# Patient Record
Sex: FEMALE | Race: BLACK OR AFRICAN AMERICAN | Employment: OTHER | URBAN - METROPOLITAN AREA
[De-identification: names, ages, dates, MRNs, and addresses within clinical notes are randomized per-mention and may not be internally consistent; named-entity substitution may affect disease eponyms.]

---

## 2017-01-20 ENCOUNTER — TELEPHONE (OUTPATIENT)
Dept: PULMONOLOGY | Age: 77
End: 2017-01-20

## 2017-01-20 NOTE — TELEPHONE ENCOUNTER
The pt. Is difficult to understand. She says she didn't have a sleep study. She says she has a CPap. She is reminded of her appt. With Dr. Misael Rojas on Tues, Jan 24.

## 2017-01-24 ENCOUNTER — OFFICE VISIT (OUTPATIENT)
Dept: PULMONOLOGY | Age: 77
End: 2017-01-24

## 2017-01-24 VITALS
RESPIRATION RATE: 20 BRPM | HEIGHT: 55 IN | BODY MASS INDEX: 47.21 KG/M2 | TEMPERATURE: 97.6 F | OXYGEN SATURATION: 92 % | HEART RATE: 83 BPM | DIASTOLIC BLOOD PRESSURE: 80 MMHG | SYSTOLIC BLOOD PRESSURE: 120 MMHG | WEIGHT: 204 LBS

## 2017-01-24 VITALS
RESPIRATION RATE: 20 BRPM | BODY MASS INDEX: 47.38 KG/M2 | DIASTOLIC BLOOD PRESSURE: 80 MMHG | WEIGHT: 204.75 LBS | HEART RATE: 83 BPM | HEIGHT: 55 IN | SYSTOLIC BLOOD PRESSURE: 120 MMHG | OXYGEN SATURATION: 92 % | TEMPERATURE: 97.6 F

## 2017-01-24 DIAGNOSIS — J44.9 CHRONIC OBSTRUCTIVE PULMONARY DISEASE, UNSPECIFIED COPD TYPE (HCC): ICD-10-CM

## 2017-01-24 DIAGNOSIS — G47.33 OSA (OBSTRUCTIVE SLEEP APNEA): Primary | ICD-10-CM

## 2017-01-24 RX ORDER — AZITHROMYCIN 250 MG/1
TABLET, FILM COATED ORAL
Qty: 6 TAB | Refills: 0 | Status: SHIPPED | OUTPATIENT
Start: 2017-01-24 | End: 2017-01-29

## 2017-01-24 RX ORDER — LORATADINE 10 MG/1
10 TABLET ORAL
COMMUNITY
End: 2017-08-08

## 2017-01-24 NOTE — PROGRESS NOTES
[facility]  Pulmonary, Critical Care, and Sleep Medicine      Chief complaint:  ALEXA and COPD    HPI:  Brianne Mcdonnell is 64years old and relates that she has shortness of breath and sometimes some vague chest discomfort and a cough productive of clear and yellow mucus and continued leg swelling. She relates she uses her CPAP but not every night  Allergies   Allergen Reactions    Lisinopril Angioedema     Document from PCP record     Current Outpatient Prescriptions   Medication Sig    loratadine (CLARITIN) 10 mg tablet Take 10 mg by mouth daily as needed for Other. One daily HS prn    cpap machine kit by Does Not Apply route. Change to Auto CPAP 5-20 and please provide new mask. Lots of leak on current Mask.  guaiFENesin-codeine (ROBITUSSIN AC) 100-10 mg/5 mL solution Take 5 mL by mouth four (4) times daily as needed for Cough. Max Daily Amount: 20 mL. Indications: COUGH    Lactobacillus Acidoph & Bulgar (FLORANEX) 1 million cell tab tablet Take 1 Tab by mouth two (2) times a day.  nystatin (MYCOSTATIN) topical cream Apply 1 g to affected area two (2) times a day.  predniSONE (DELTASONE) 10 mg tablet Take 5 tabs orally for two days, thereafter take 4 tabs orally for two days, thereafter take 3 tablets orally for two days, thereafter take 2 tabs for 2 days, then 1 tab for 2 days    cefpodoxime (VANTIN) 100 mg tablet Take 1 Tab by mouth every twelve (12) hours. Indications: BACTERIAL PNEUMONIA    acetaminophen (TYLENOL) 500 mg tablet Take 500 mg by mouth every six (6) hours as needed for Pain.  aspirin delayed-release 81 mg tablet Take 81 mg by mouth daily.  pantoprazole (PROTONIX) 40 mg tablet Take 40 mg by mouth daily. Indications: GASTROESOPHAGEAL REFLUX    ergocalciferol (ERGOCALCIFEROL) 50,000 unit capsule Take 50,000 Units by mouth every seven (7) days.  Indications: VITAMIN D DEFICIENCY (HIGH DOSE THERAPY)    ipratropium (ATROVENT) 0.02 % nebulizer solution 0.5 mg by Nebulization route as needed for Wheezing.  albuterol (PROVENTIL HFA, VENTOLIN HFA, PROAIR HFA) 90 mcg/actuation inhaler Take  by inhalation every six (6) hours as needed for Wheezing.  Calcium Carbonate-Vit D3-Min 600 mg calcium- 400 unit tab Take 1 Tab by mouth two (2) times a day.  gabapentin (NEURONTIN) 300 mg capsule Take 300 mg by mouth three (3) times daily.  NIFEdipine ER (ADALAT CC) 30 mg ER tablet Take  by mouth daily.  cloNIDine (CATAPRES) 0.1 mg tablet Take 0.1 mg by mouth two (2) times a day.  POTASSIUM CHLORIDE SR 10 MEQ TAB      benzonatate (TESSALON) 100 mg capsule Take 100 mg by mouth three (3) times daily as needed.  furosemide (LASIX) 20 mg tablet Take  by mouth daily.  simvastatin (ZOCOR) 40 mg tablet Take 20 mg by mouth nightly.  metFORMIN (GLUCOPHAGE) 500 mg tablet Take 1,000 mg by mouth two (2) times daily (with meals).  fluticasone-salmeterol (ADVAIR DISKUS) 250-50 mcg/dose diskus inhaler Take 1 Puff by inhalation every twelve (12) hours.  albuterol (PROVENTIL VENTOLIN) 2.5 mg /3 mL (0.083 %) nebulizer solution by Nebulization route once. No current facility-administered medications for this visit. Past Medical History   Diagnosis Date    Asthma     Chronic lung disease     COPD     Diabetes ([facility] [address]     Diabetes mellitus ([facility] [address] [facility]     History of stomach ulcers     HTN (hypertension)     Hypertension     Hypothyroid     ALEXA (obstructive sleep apnea)     Other and unspecified hyperlipidemia     Personal history of tobacco use, presenting hazards to health     Sleep disturbance, unspecified      No past surgical history on file. Social History     Social History    Marital status:      Spouse name: N/A    Number of children: N/A    Years of education: N/A     Occupational History    Not on file.      Social History Main Topics    Smoking status: Former Smoker     Packs/day: 1.50     Years: 60.00     Types: Cigarettes Start date: 9/24/1952     Quit date: 7/1/2016    Smokeless tobacco: Never Used    Alcohol use No    Drug use: No    Sexual activity: Not on file     Other Topics Concern    Not on file     Social History Narrative     Family History   Problem Relation Age of Onset    Diabetes Other     Breast Cancer Sister     Cancer Brother        Review of systems:  She denies fever chills or appetite or weight loss    Physical Exam:  Visit Vitals    /80    Pulse 83    Temp 97.6 °F (36.4 °C)    Resp 20    Ht 4' 7\" (1.397 m)    Wt 92.5 kg (204 lb)    SpO2 92%    BMI 47.41 kg/m2       Well-developed and elderly  HEENT: WL  Lymph node exam: Supraclavicular cervical lymph nodes negative  Chest: Equal symmetrical expansion no dullness no wheezes or rales rubs  Heart: Regular rhythm no gallop or murmur no JVD brawny edema of the lower extremities  Extremities: No cyanosis or clubbing    Neurological: Alert and oriented  LABS:    O2 sat 92% room air    Impression:   ALEXA by history poor compliance with CPAP  Leg edema and shortness of breath possibly related to COPD but we've never been able to quantify her COPD occurs of inability to perform pulmonary function tests but I suspect she has significant pulmonary hypertension from poorly treated ALEXA  By history bronchitis  Plan:  Short course of Zithromax  Continue bronchodilators  Continue CPAP with try to improve compliance  Followup in 4 months    Conner Ghotra MD , [facility]    CC: Anders Montes MD     [address] [facility]  [place], [address] [address],[place] [address]     P: [phone]     F: [phone]

## 2017-01-24 NOTE — PROGRESS NOTES
Lori Reyes M.D. Pulmonary Critical Care & Sleep Medicine     Sleep Office Initial Consultation    Name: Yamini Stover     : 1940     Date: 2017        Subjective:  Consult requested from Dr. Dick John for evaluation and follow up of sleep apnea. Patient actually had a sleep study that was ordered on , which has not been done, and she is in the clinic today for follow up. Briefly, patient carries a diagnosis of sleep apnea. As per the patient, she has been diagnosed many, many years ago. She has had some kind of study done somewhere [place], she does not remember the exact name of the place and does not remember the address or phone number. We do not have old sleep study results available to us. Basically she has been prescribed CPAP of 12. Along with that she started noticing worsening of symptoms. She does not snore stop breathing while using the machine, but started feeling profoundly fatigued and tired. Usually goes to bed around 8:00, wakes up at 4:00 in the morning. Usually takes about 2 minutes to go to sleep. Does wake up multiple times at nighttime to go to the bathroom because of urinary urgency. Does not have any vivid dreams. Does wake up naturally. From sleep hygiene point of view, does not have any regular sleep/wake cycle. Does watch TV, works on a computer at nighttime. Does find her mattress comfortable. Does drink coffee, tea and  the daytime. Denies any history suggestive of heart attack, stroke, COPD, heart failure, tonsillectomy. From RLS, parasomnia point of view, does have history of bedwetting because of weak bladder. Denies any symptoms suggestive of restless leg syndrome, cataplexy or narcolepsy per se. Does feel excessively sleepy and tired during the daytime, does take two one hour naps during the daytime. She currently does not drive. Her [place] sleepiness score is 18/24.     Current home care company is [facility]. We downloaded the compliance data from last 90 days, which showed patient is only 50% compliant with treatment, however patient has significant leak also noted on that and also AHI has remained around 7. Patient also has significant nasal dryness or nasal discharge and pressure intolerance with current CPAP therapy. Assessment:  1. Evaluation for sleep apnea. 2. Excess daytime sleepiness. 3. Unknown severity of sleep apnea at this point. 4. On CPAP of 12. Issues with compliance, issues with leak and issues with AHI. Discussion:  First and foremost, it is very difficult at this point to ascertain as we do not know the exact etiology of the sleep apnea to go by. What we assume is that patient just has received her CPAP machine, so more than likely patient has an established diagnosis of sleep apnea and she was getting benefit out of CPAP treatment before. Over a period of time it is possible that patient's CPAP of 12 has not been adequate as she has gained weight and patient might require higher CPAP pressure. As we do not have an exact sleep study report available to us, we will consider switching the patient to auto CPAP of 5-20 and evaluate her further with compliance download. Patient also has significant leak. She has not received any supplies for the last six months. I discussed with the patient at length. We will also send a new supply order out to the [address] [place] [address] Road to get the new supplies out. If patient continues to have excess daytime sleepiness with improvement in the AHI, then other workup should be considered. If there are still issues with AHI or compliance with auto CPAP, also a dedicated split night study to identify the exact severity of sleep apnea, as if patient just has mild sleep apnea, other treatment options can be considered.      Patient should follow up with the sleep specialist in about 2-3 months time frame with adjustment of the auto CPAP pressures and change of mask. Plan of care discussed with the patient at length. Spent more than 45 minutes of my time trying to coordinate care,  patient, go over various aspects of patient's care. More than 50% of time spent in face to face evaluation. Patient continues to have close follow up with Dr. Arnold Church in regard to hypoxemic respiratory failure and other symptoms. Final Recommendations:  1. Change to auto CPAP 5-20.  2. New mask. 3. Continue close follow up with the sleep specialist.    C:     Dr. Carlie Mcdonnell M.D. Prior/old records reviewed and discussed with patient. Labs, Images and available PFT and sleep study discussed with patient. Labs and images personally seen and available reports reviewed with patient. All current medicines are reviewed and doses and prescription adjusted. Plan of care discussed/reported to primary physician. Plan of care discussed with patient  HIM care and advance directive per PCP  Pathophysiology, severity, risk factors, association to cardiovascular morbidities and excessive daytime sleepiness, consequences of untreated sleep apnea were discussed with the patient  Treatment options including CPAP, dental appliance, weight reduction measures, positional therapy, surgeries etc were discussed  Safe driving and operating machineries practices were advised    Past Medical History   Diagnosis Date    Asthma     Chronic lung disease     COPD     [facility] [address] [facility]     Diabetes mellitus ([facility] [facility]     History of stomach ulcers     HTN (hypertension)     Hypertension     Hypothyroid     ALEXA (obstructive sleep apnea)     Other and unspecified hyperlipidemia     Personal history of tobacco use, presenting hazards to health     Sleep disturbance, unspecified        History reviewed. No pertinent past surgical history.     Social History     Social History    Marital status:      Spouse name: N/A    Number of children: N/A    Years of education: N/A     Social History Main Topics    Smoking status: Former Smoker     Packs/day: 1.50     Years: 60.00     Types: Cigarettes     Start date: 9/24/1952     Quit date: 7/1/2016    Smokeless tobacco: Never Used    Alcohol use No    Drug use: No    Sexual activity: Not Asked     Other Topics Concern    None     Social History Narrative       Family History   Problem Relation Age of Onset    Diabetes Other     Breast Cancer Sister     Cancer Brother        Allergies   Allergen Reactions    Lisinopril Angioedema     Document from PCP record       . Current Outpatient Prescriptions   Medication Sig Dispense Refill    loratadine (CLARITIN) 10 mg tablet Take 10 mg by mouth daily as needed for Other. One daily HS prn      cpap machine kit by Does Not Apply route. Change to Auto CPAP 5-20 and please provide new mask. Lots of leak on current Mask. 1 Kit 0    guaiFENesin-codeine (ROBITUSSIN AC) 100-10 mg/5 mL solution Take 5 mL by mouth four (4) times daily as needed for Cough. Max Daily Amount: 20 mL. Indications: COUGH 1 Bottle 0    Lactobacillus Acidoph & Bulgar (FLORANEX) 1 million cell tab tablet Take 1 Tab by mouth two (2) times a day. 10 Tab 0    predniSONE (DELTASONE) 10 mg tablet Take 5 tabs orally for two days, thereafter take 4 tabs orally for two days, thereafter take 3 tablets orally for two days, thereafter take 2 tabs for 2 days, then 1 tab for 2 days 32 Tab 0    cefpodoxime (VANTIN) 100 mg tablet Take 1 Tab by mouth every twelve (12) hours. Indications: BACTERIAL PNEUMONIA 6 Tab 0    acetaminophen (TYLENOL) 500 mg tablet Take 500 mg by mouth every six (6) hours as needed for Pain.  aspirin delayed-release 81 mg tablet Take 81 mg by mouth daily.  pantoprazole (PROTONIX) 40 mg tablet Take 40 mg by mouth daily. Indications: GASTROESOPHAGEAL REFLUX      ergocalciferol (ERGOCALCIFEROL) 50,000 unit capsule Take 50,000 Units by mouth every seven (7) days.  Indications: VITAMIN D DEFICIENCY (HIGH DOSE THERAPY)      ipratropium (ATROVENT) 0.02 % nebulizer solution 0.5 mg by Nebulization route as needed for Wheezing.  albuterol (PROVENTIL HFA, VENTOLIN HFA, PROAIR HFA) 90 mcg/actuation inhaler Take  by inhalation every six (6) hours as needed for Wheezing.  Calcium Carbonate-Vit D3-Min 600 mg calcium- 400 unit tab Take 1 Tab by mouth two (2) times a day.  gabapentin (NEURONTIN) 300 mg capsule Take 300 mg by mouth three (3) times daily.  NIFEdipine ER (ADALAT CC) 30 mg ER tablet Take  by mouth daily.  cloNIDine (CATAPRES) 0.1 mg tablet Take 0.1 mg by mouth two (2) times a day.  POTASSIUM CHLORIDE SR 10 MEQ TAB        furosemide (LASIX) 20 mg tablet Take  by mouth daily.  simvastatin (ZOCOR) 40 mg tablet Take 20 mg by mouth nightly.  metFORMIN (GLUCOPHAGE) 500 mg tablet Take 1,000 mg by mouth two (2) times daily (with meals).  fluticasone-salmeterol (ADVAIR DISKUS) 250-50 mcg/dose diskus inhaler Take 1 Puff by inhalation every twelve (12) hours.  albuterol (PROVENTIL VENTOLIN) 2.5 mg /3 mL (0.083 %) nebulizer solution by Nebulization route once.  nystatin (MYCOSTATIN) topical cream Apply 1 g to affected area two (2) times a day. 30 g 0    benzonatate (TESSALON) 100 mg capsule Take 100 mg by mouth three (3) times daily as needed.              Review of Systems:  HEENT: No epistaxis, no nasal drainage, no difficulty in swallowing, no redness in eyes  Respiratory: No cough sob or wheezing  Sleep: As above  Cardiovascular: no chest pain, no palpitations, no chronic leg edema, no syncope  Gastrointestinal: no abd pain, no vomiting, no diarrhea, no bleeding symptoms  Genitourinary: No urinary symptoms or hematuria  Integument/breast: No ulcers or rashes  Musculoskeletal:Neg  Neurological: No focal weakness, no seizures, no headaches  Behvioral/Psych: No anxiety, no depression  Constitutional: No fever, no chills, no weight loss, no night sweats     Objective:     Visit Vitals    /80 (BP 1 Location: Left arm, BP Patient Position: Sitting)    Pulse 83    Temp 97.6 °F (36.4 °C)    Resp 20    Ht 4' 7\" (1.397 m)    Wt 92.9 kg (204 lb 12 oz)    SpO2 92%    BMI 47.59 kg/m2        Physical Exam: Weight 92 ESS 21 Neck Cir 16.5,   General: comfortable, no acute distress  HEENT: pupils reactive, sclera anicteric, EOM intact, Malampati score 2,   Tongue: Macroglossia y Teeth indentation y  Chin: Micrognathia y  Neck: No adenopathy or thyroid swelling, no lymphadenopathy or JVD, supple  CVS: S1S2 no murmurs  RS: Mod AE bilaterally, no tactile fremitus or egophony, no accessory muscle use  Abd: soft, non tender, no hepatosplenomegaly  Neuro: non focal, awake, alert  Extrm: no leg edema, clubbing or cyanosis  Skin: no rash    Data review:     Chemistry No results for input(s): GLU, NA, K, CL, CO2, BUN, CREA, CA, MG, PHOS, AGAP, BUCR, TBIL, GPT, AP, TP, ALB, GLOB, AGRAT in the last 72 hours. Lactic Acid No results found for: LAC  No results for input(s): LAC in the last 72 hours. Liver Enzymes Protein, total   Date Value Ref Range Status   05/21/2016 7.4 6.4 - 8.2 g/dL Final     Albumin   Date Value Ref Range Status   05/21/2016 3.6 3.4 - 5.0 g/dL Final     Globulin   Date Value Ref Range Status   05/21/2016 3.8 2.0 - 4.0 g/dL Final     A-G Ratio   Date Value Ref Range Status   05/21/2016 0.9 0.8 - 1.7   Final     AST   Date Value Ref Range Status   05/21/2016 22 15 - 37 U/L Final     Alk. phosphatase   Date Value Ref Range Status   05/21/2016 89 45 - 117 U/L Final     No results for input(s): TP, ALB, GLOB, AGRAT, SGOT, GPT, AP, TBIL in the last 72 hours. No lab exists for component: DBIL     CBC w/Diff No results for input(s): WBC, RBC, HGB, HCT, PLT, GRANS, LYMPH, EOS, HGBEXT, HCTEXT, PLTEXT in the last 72 hours.      Cardiac Enzymes No results found for: CPK, CKMMB, CKMB, RCK3, CKMBT, CKNDX, CKND1, MARYCARMEN, TROPT, TROIQ, ANA, TROPT, TNIPOC, BNP, BNPP     BNP No results found for: BNP, BNPP, XBNPT     Coagulation No results for input(s): PTP, INR, APTT in the last 72 hours. No lab exists for component: INREXT      Thyroid  Lab Results   Component Value Date/Time    TSH 0.87 06/11/2014 08:00 PM          ABG No results for input(s): PHI, PHI, PCO2I, PO2, PO2I, HCO3, HCO3I, FIO2, FIO2I in the last 72 hours. No lab exists for component: POC2     Urinalysis Lab Results   Component Value Date/Time    Color YELLOW 05/21/2016 09:43 AM    Appearance CLEAR 05/21/2016 09:43 AM    Specific gravity 1.007 05/21/2016 09:43 AM    pH (UA) 8.0 05/21/2016 09:43 AM    Protein NEGATIVE  05/21/2016 09:43 AM    Glucose NEGATIVE  05/21/2016 09:43 AM    Ketone NEGATIVE  05/21/2016 09:43 AM    Bilirubin NEGATIVE  05/21/2016 09:43 AM    Urobilinogen 0.2 05/21/2016 09:43 AM    Nitrites NEGATIVE  05/21/2016 09:43 AM    Leukocyte Esterase NEGATIVE  05/21/2016 09:43 AM        Micro  No results for input(s): SDES, CULT in the last 72 hours. No results for input(s): CULT in the last 72 hours. XR (Most Recent). CXR reviewed by me and compared with previous CXR   Results from Hospital Encounter encounter on 05/21/16   XR CHEST PORT   Narrative Description:  Portable upright chest, single view    Clinical Indication:  Shortness of breath    Comparison: June 11, 2014; April 6, 2009. Findings: There is opacity at the left lung base which obscures the left hemidiaphragm and  costophrenic angle, increased from the comparison exams. Mildly increased  interstitial markings at the right lung base with some central prominence of  pulmonary vasculature. The cardiac silhouette is upper limits of normal or just  above, stable. Moderate atherosclerosis of the thoracic aorta. The femoral heads are high riding bilaterally.          Impression Impression:      Most convincing interval change since comparison radiographs is obscuration of  the left hemidiaphragm compatible with underlying atelectasis or infiltrate. There may be a mild component of central vascular congestion which is increased  from the comparison. CT (Most Recent)   Results from Hospital Encounter encounter on 05/21/16   CTA CHEST W WO CONT   Narrative CTA chest- pulmonary embolus protocol     CPT code: 35889     Indications: Shortness of breath. Technique: Utilizing pulmonary embolus protocol, thin section axial images were  obtained from the thoracic inlet into the upper abdomen after the uneventful  administration of IV contrast. Coronal and sagittal maximum intensity projection  (MIP) post-processed images were generated to better define pulmonary artery  anatomy and enhance sensitivity for detection of pulmonary emboli. Contrast used: 100 cc [id number]    Comparison: October 1, 2009. Findings:    Mildly inhomogeneous opacification of distal pulmonary arteries limits  sensitivity for pulmonary embolism somewhat. No definite filling defects are  seen within the main or segmental pulmonary arteries. Distal subsegmental branch  is not well evaluated. Thoracic aorta shows moderate-marked atherosclerotic  disease without aneurysm or dissection. Visualized thyroid unremarkable. No  mediastinal, hilar or axillary adenopathy. No pericardial effusion. No pleural  effusion. There is bibasilar atelectasis, left greater than right. Mildly  prominent interstitial markings particularly on the right. There is AP narrowing  of the distal trachea and of the left mainstem bronchus. A small portion of the upper abdomen is included on this study. The visualized  portions of the upper abdomen show fusiform thickening of the left adrenal gland  with low central attenuation suggesting adenomatous changes. There is a small  hiatal hernia present. Upper abdomen otherwise unremarkable. Moderate thoracic spondylosis is seen. Impression IMPRESSION[de-identified]    1.  Mildly limited evaluation of distal subsegmental branches. No convincing  evidence of pulmonary embolus. 2. Bibasilar atelectasis, left greater than right. Superimposed infection not  excluded but considered less likely. 3. Mild interstitial edema. 4. Tracheobronchomalacia. 5. Marked atherosclerosis of the thoracic aorta. No dissection or aneurysm. 6. Hiatal hernia. 7. Diffuse thickening of the left adrenal gland with low-attenuation change  compatible with adenomatous hyperplasia. Thank you for this referral.           EKG No results found for this or any previous visit. ECHO No results found for this or any previous visit. PFT No flowsheet data found.             Terrence Navarro MD  Pulmonary Critical Care & Sleep Medicine   0

## 2017-01-24 NOTE — PROGRESS NOTES
The pt. States she was seen by a Sleep specialist here some years ago. She has had a Cpap for a number of years.

## 2017-01-24 NOTE — PATIENT INSTRUCTIONS
Learning About CPAP for Sleep Apnea  What is CPAP? CPAP is a small machine that you use at home every night while you sleep. It increases air pressure in your throat to keep your airway open. When you have sleep apnea, this can help you sleep better so you feel much better. CPAP stands for \"continuous positive airway pressure. \"  The CPAP machine will have one of the following:  · A mask that covers your nose and mouth  · Prongs that fit into your nose  · A mask that covers your nose only, the most common type. This type is called NCPAP. The N stands for \"nasal.\"  Why is it done? CPAP is usually the best treatment for obstructive sleep apnea. It is the first treatment choice and the most widely used. Your doctor may suggest CPAP if you have:  · Moderate to severe sleep apnea. · Sleep apnea and coronary artery disease (CAD) or heart failure. How does it help? · CPAP can help you have more normal sleep, so you feel less sleepy and more alert during the daytime. · CPAP may help keep heart failure or other heart problems from getting worse. · CPAP may help lower your blood pressure. · If you use CPAP, your bed partner may also sleep better because you are not snoring or restless. What are the side effects? Some people who use CPAP have:  · A dry or stuffy nose and a sore throat. · Irritated skin on the face. · Sore eyes. · Bloating. If you have any of these problems, work with your doctor to fix them. Here are some things you can try:  · Be sure the mask or nasal prongs fit well. · See if your doctor can adjust the pressure of your CPAP. · If your nose is dry, try a humidifier. · If your nose is runny or stuffy, try decongestant medicine or a steroid nasal spray. Be safe with medicines. Read and follow all instructions on the label. Do not use the medicine longer than the label says. If these things do not help, you might try a different type of machine.  Some machines have air pressure that adjusts on its own. Others have air pressures that are different when you breathe in than when you breathe out. This may reduce discomfort caused by too much pressure in your nose. Where can you learn more? Go to http://timbo-tiesha.info/. Enter K870 in the search box to learn more about \"Learning About CPAP for Sleep Apnea. \"  Current as of: May 23, 2016  Content Version: 11.1  © 20063563-8875 [employer]. Care instructions adapted under license by [employer] (which disclaims liability or warranty for this information). If you have questions about a medical condition or this instruction, always ask your healthcare professional. [address] any warranty or liability for your use of this information.

## 2017-01-26 ENCOUNTER — PATIENT OUTREACH (OUTPATIENT)
Dept: PULMONOLOGY | Age: 77
End: 2017-01-26

## 2017-01-26 NOTE — PROGRESS NOTES
65 yo female referred to  by Dr. Prasad Garcia for CPAP, medication and chronic condition education. Met with patient in exam room. Patient seated in wheelchair. A&Ox3. Social: lives alone. Daughter and nephew assist as needed. Patient reports decline in functional status and would like assistance at home. Patient with [employer] Medicare with Kailey Larsen PCP. No LTC insurance. Medicare will not pay for personal care. Patient will need to hire privately and pay out of pocket or apply for [employer] [address] Patient reports she has applied for Medicaid \"3-4 months\" ago. States she had received a follow up call to schedule home visit but the patient did not call back. Encouraged patient to continue contact with  as there are several steps to apply for Medicaid. Patient provided with a list of [address]. Patient can call and inquire about pricing and availability. No doctor order is needed. Kailey Larsen has Case Management services as well as Social Workers. Encouraged patient to seek support from Kailey Larsen for guidance with Medicaid, personal care and [place] [address] options. Patient has CPAP appliance for ALEXA. Reported inconsistent compliance due to mask issues. Dr. Ben Caballero has evaluated and new settings as well as new mask options have been ordered. Plan of care:   1. Patient to follow up with  re: Medicaid application. 2. Patient can private pay for personal care services until Medicaid obtained: list of local agencies provided to patient. 3. Patient to request case management/social work services with Kailey Larsen to assist with Medicaid process. Patient has [employer] which has case management programs but as [facility], all services have to be handled by Kailey Larsen.

## 2017-02-06 ENCOUNTER — PATIENT OUTREACH (OUTPATIENT)
Dept: PULMONOLOGY | Age: 77
End: 2017-02-06

## 2017-02-14 ENCOUNTER — PATIENT OUTREACH (OUTPATIENT)
Dept: PULMONOLOGY | Age: 77
End: 2017-02-14

## 2017-02-14 NOTE — PROGRESS NOTES
Contacted [facility] re: CPAP. Spoke with Christo Blair, confirms receipt of order for CPAP. States they have not been able to contact the patient. Reports calling 2/9 and 2/13, no voice mail. Reports also trying to contact daughter without success. Confirmed phone numbers. [facility] will continue to reach out to patient.

## 2017-02-21 ENCOUNTER — HOSPITAL ENCOUNTER (OUTPATIENT)
Dept: NON INVASIVE DIAGNOSTICS | Age: 77
Discharge: HOME | End: 2017-02-21
Attending: INTERNAL MEDICINE
Payer: MEDICARE

## 2017-02-21 DIAGNOSIS — G47.33 OSA (OBSTRUCTIVE SLEEP APNEA): ICD-10-CM

## 2017-02-21 PROCEDURE — 93306 TTE W/DOPPLER COMPLETE: CPT

## 2017-03-07 ENCOUNTER — PATIENT OUTREACH (OUTPATIENT)
Dept: PULMONOLOGY | Age: 77
End: 2017-03-07

## 2017-03-07 NOTE — PROGRESS NOTES
Per [facility], CPAP setting have been changed remotely. Mask was delivered 2/28, received nasal cushion mask. Compliance report shows patient has not used machine since mask change. [facility] will send RT to reach out to patient in hopes of troubleshooting and increasing compliance. Attempted to contact patient, no answer, no voice mail.

## 2017-05-16 ENCOUNTER — OFFICE VISIT (OUTPATIENT)
Dept: PULMONOLOGY | Age: 77
End: 2017-05-16

## 2017-05-16 VITALS
SYSTOLIC BLOOD PRESSURE: 110 MMHG | HEIGHT: 55 IN | DIASTOLIC BLOOD PRESSURE: 60 MMHG | HEART RATE: 85 BPM | TEMPERATURE: 97.5 F | OXYGEN SATURATION: 95 % | WEIGHT: 201 LBS | RESPIRATION RATE: 15 BRPM | BODY MASS INDEX: 46.52 KG/M2

## 2017-05-16 DIAGNOSIS — G47.33 OSA (OBSTRUCTIVE SLEEP APNEA): Primary | ICD-10-CM

## 2017-05-16 DIAGNOSIS — J44.9 CHRONIC OBSTRUCTIVE PULMONARY DISEASE, UNSPECIFIED COPD TYPE (HCC): ICD-10-CM

## 2017-05-16 RX ORDER — TRAMADOL HYDROCHLORIDE 50 MG/1
50 TABLET ORAL
COMMUNITY
End: 2017-08-08

## 2017-05-16 NOTE — MR AVS SNAPSHOT
Visit Information Date & Time Provider Department Dept. Phone Encounter #  
 5/16/2017 10:15 AM [facility] Lore Yang [id number] Follow-up Instructions Return in about 6 months (around 11/16/2017). Follow-up and Disposition History Your Appointments 5/16/2017 10:15 AM  
Follow Up with Freddy Arias MD  
[address] ([facility]) Appt Note: FROM 1/24/17; Spoke w/January @ [facility] to req ref 5/9/17; ***PHONE NUMBERS NOT WORKING***  
 [address] [place] [address], [address] [address]  
[phone]  
  
   
 [address] [place] [address], [address] [place] [address],[address] [place] [address] Upcoming Health Maintenance Date Due  
 FOOT EXAM Q1 5/28/1950 EYE EXAM RETINAL OR DILATED Q1 5/28/1950 DTaP/Tdap/Td series (1 - Tdap) 5/28/1959 ZOSTER VACCINE AGE 60> 5/28/1998 GLAUCOMA SCREENING Q2Y 5/28/2003 Pneumococcal 65+ Low/Medium Risk (1 of 2 - PCV13) 5/28/2003 MEDICARE YEARLY EXAM 5/28/2003 MICROALBUMIN Q1 3/25/2012 LIPID PANEL Q1 3/25/2012 HEMOGLOBIN A1C Q6M 11/21/2016 INFLUENZA AGE 9 TO ADULT 8/1/2017 Allergies as of 5/16/2017  Review Complete On: 5/16/2017 By: Nkechi Mota RN Severity Noted Reaction Type Reactions Lisinopril High 05/22/2016    Angioedema Document from PCP record Current Immunizations  Never Reviewed No immunizations on file. Not reviewed this visit You Were Diagnosed With   
  
 Codes Comments ALEXA (obstructive sleep apnea)    -  Primary ICD-10-CM: G47.33 
ICD-9-CM: 327.23 Chronic obstructive pulmonary disease, unspecified COPD type ([facility]     ICD-10-CM: J44.9 ICD-9-CM: 987 Vitals BP Pulse Temp Resp Height(growth percentile) Weight(growth percentile) 110/60 (BP 1 Location: Left arm, BP Patient Position: Sitting) 85 97.5 °F (36.4 °C) (Oral) 15 4' 7\" (1.397 m) 201 lb (91.2 kg) SpO2 BMI OB Status Smoking Status 95% 46.72 kg/m2 Postmenopausal Former Smoker Vitals History BMI and BSA Data Body Mass Index Body Surface Area 46.72 kg/m 2 1.88 m 2 Your Updated Medication List  
  
   
This list is accurate as of: 5/16/17 10:04 AM.  Always use your most recent med list.  
  
  
  
  
 acetaminophen 500 mg tablet Commonly known as:  TYLENOL Take 500 mg by mouth every six (6) hours as needed for Pain. ADVAIR DISKUS 250-50 mcg/dose diskus inhaler Generic drug:  fluticasone-salmeterol Take 1 Puff by inhalation every twelve (12) hours. * albuterol 2.5 mg /3 mL (0.083 %) nebulizer solution Commonly known as:  PROVENTIL VENTOLIN  
by Nebulization route once. * albuterol 90 mcg/actuation inhaler Commonly known as:  PROVENTIL HFA, VENTOLIN HFA, PROAIR HFA Take  by inhalation every six (6) hours as needed for Wheezing. aspirin delayed-release 81 mg tablet Take 81 mg by mouth daily. Calcium Carbonate-Vit D3-Min 600 mg calcium- 400 unit Tab Take 1 Tab by mouth two (2) times a day. cefpodoxime 100 mg tablet Commonly known as:  Oneta Gall Take 1 Tab by mouth every twelve (12) hours. Indications: BACTERIAL PNEUMONIA  
  
 cloNIDine HCl 0.1 mg tablet Commonly known as:  CATAPRES Take 0.1 mg by mouth two (2) times a day. cpap machine kit  
by Does Not Apply route. Change to Auto CPAP 5-20 and please provide new mask. Lots of leak on current Mask. ergocalciferol 50,000 unit capsule Commonly known as:  ERGOCALCIFEROL Take 50,000 Units by mouth every seven (7) days. Indications: VITAMIN D DEFICIENCY (HIGH DOSE THERAPY)  
  
 gabapentin 300 mg capsule Commonly known as:  NEURONTIN Take 300 mg by mouth three (3) times daily. guaiFENesin-codeine 100-10 mg/5 mL solution Commonly known as:  ROBITUSSIN AC Take 5 mL by mouth four (4) times daily as needed for Cough. Max Daily Amount: 20 mL.  Indications: COUGH  
  
 ipratropium 0.02 % nebulizer solution Commonly known as:  ATROVENT  
0.5 mg by Nebulization route as needed for Wheezing. LASIX 20 mg tablet Generic drug:  furosemide Take  by mouth daily. loratadine 10 mg tablet Commonly known as:  Zaria Gills Take 10 mg by mouth daily as needed for Other. One daily HS prn  
  
 metFORMIN 500 mg tablet Commonly known as:  GLUCOPHAGE Take 1,000 mg by mouth two (2) times daily (with meals). NIFEdipine ER 30 mg ER tablet Commonly known as:  ADALAT CC Take  by mouth daily. nystatin topical cream  
Commonly known as:  MYCOSTATIN Apply 1 g to affected area two (2) times a day. pantoprazole 40 mg tablet Commonly known as:  PROTONIX Take 40 mg by mouth daily. Indications: GASTROESOPHAGEAL REFLUX  
  
 POTASSIUM CHLORIDE SR 10 MEQ TAB  
  
 simvastatin 40 mg tablet Commonly known as:  ZOCOR Take 20 mg by mouth nightly. traMADol 50 mg tablet Commonly known as:  ULTRAM  
Take 50 mg by mouth every six (6) hours as needed for Pain. * Notice: This list has 2 medication(s) that are the same as other medications prescribed for you. Read the directions carefully, and ask your doctor or other care provider to review them with you. Follow-up Instructions Return in about 6 months (around 11/16/2017). Patient Instructions Always use her CPAP at night with sleep Continue Advair 2 puffs twice daily and remember to wash mouth with water and spit it out after inhaling Advair Albuterol by nebulization or by hand-held inhaler (2 puffs) every 4 hours as needed and if you require albuterol too often to control respiratory symptoms call the office for severe symptoms go to the emergency room Always call for symptoms such as increasing shortness of breath or cough productive of discolored mucus Introducing [facility]!    
 [facility] [employer] introduces [employer] patient portal. Now you can access parts of your medical record, email your doctor's office, and request medication refills online. 1. In your internet browser, go to [email or web address]. [email or web address] Card 2. Click on the First Time User? Click Here link in the Sign In box. You will see the New Member Sign Up page. 3. Enter your [employer] Access Code exactly as it appears below. You will not need to use this code after youve completed the sign-up process. If you do not sign up before the expiration date, you must request a new code. · [employer] Access Code: [email or web address] Expires: 8/14/2017  9:57 AM 
 
4. Enter the last four digits of your Social Security Number (xxxx) and Date of Birth (mm/dd/yyyy) as indicated and click Submit. You will be taken to the next sign-up page. 5. Create a [employer] ID. This will be your [employer] login ID and cannot be changed, so think of one that is secure and easy to remember. 6. Create a [employer] password. You can change your password at any time. 7. Enter your Password Reset Question and Answer. This can be used at a later time if you forget your password. 8. Enter your e-mail address. You will receive e-mail notification when new information is available in [address] 9. Click Sign Up. You can now view and download portions of your medical record. 10. Click the Download Summary menu link to download a portable copy of your medical information. If you have questions, please visit the Frequently Asked Questions section of the [employer] website. Remember, [employer] is NOT to be used for urgent needs. For medical emergencies, dial 911. Now available from your iPhone and Android! Please provide this summary of care documentation to your next provider. Your primary care clinician is listed as Arelis Steiner. If you have any questions after today's visit, please call [phone].

## 2017-05-16 NOTE — PROGRESS NOTES
[facility]  Pulmonary, Critical Care, and Sleep Medicine      Chief complaint:  COPD and ALEXA    HPI:  Vera Sanchez is 64years old returns to the office today for followup concerning COPD and ALEXA and relates that she uses her CPAP some of the time and uses her Advair faithfully and uses her albuterol about once a day. She denies chest pain and reports a cough productive of clear mucus recently related to what she called a cold and reports improving leg swelling without leg pain  And continues to have dyspnea on exertion. Allergies   Allergen Reactions    Lisinopril Angioedema     Document from PCP record     Current Outpatient Prescriptions   Medication Sig    traMADol (ULTRAM) 50 mg tablet Take 50 mg by mouth every six (6) hours as needed for Pain.  loratadine (CLARITIN) 10 mg tablet Take 10 mg by mouth daily as needed for Other. One daily HS prn    cpap machine kit by Does Not Apply route. Change to Auto CPAP 5-20 and please provide new mask. Lots of leak on current Mask.  nystatin (MYCOSTATIN) topical cream Apply 1 g to affected area two (2) times a day.  acetaminophen (TYLENOL) 500 mg tablet Take 500 mg by mouth every six (6) hours as needed for Pain.  aspirin delayed-release 81 mg tablet Take 81 mg by mouth daily.  pantoprazole (PROTONIX) 40 mg tablet Take 40 mg by mouth daily. Indications: GASTROESOPHAGEAL REFLUX    ergocalciferol (ERGOCALCIFEROL) 50,000 unit capsule Take 50,000 Units by mouth every seven (7) days. Indications: VITAMIN D DEFICIENCY (HIGH DOSE THERAPY)    ipratropium (ATROVENT) 0.02 % nebulizer solution 0.5 mg by Nebulization route as needed for Wheezing.  albuterol (PROVENTIL HFA, VENTOLIN HFA, PROAIR HFA) 90 mcg/actuation inhaler Take  by inhalation every six (6) hours as needed for Wheezing.  Calcium Carbonate-Vit D3-Min 600 mg calcium- 400 unit tab Take 1 Tab by mouth two (2) times a day.     gabapentin (NEURONTIN) 300 mg capsule Take 300 mg by mouth three (3) times daily.  NIFEdipine ER (ADALAT CC) 30 mg ER tablet Take  by mouth daily.  cloNIDine (CATAPRES) 0.1 mg tablet Take 0.1 mg by mouth two (2) times a day.  POTASSIUM CHLORIDE SR 10 MEQ TAB      furosemide (LASIX) 20 mg tablet Take  by mouth daily.  simvastatin (ZOCOR) 40 mg tablet Take 20 mg by mouth nightly.  metFORMIN (GLUCOPHAGE) 500 mg tablet Take 1,000 mg by mouth two (2) times daily (with meals).  fluticasone-salmeterol (ADVAIR DISKUS) 250-50 mcg/dose diskus inhaler Take 1 Puff by inhalation every twelve (12) hours.  albuterol (PROVENTIL VENTOLIN) 2.5 mg /3 mL (0.083 %) nebulizer solution by Nebulization route once.  guaiFENesin-codeine (ROBITUSSIN AC) 100-10 mg/5 mL solution Take 5 mL by mouth four (4) times daily as needed for Cough. Max Daily Amount: 20 mL. Indications: COUGH    cefpodoxime (VANTIN) 100 mg tablet Take 1 Tab by mouth every twelve (12) hours. Indications: BACTERIAL PNEUMONIA     No current facility-administered medications for this visit. Past Medical History:   Diagnosis Date    Asthma     Chronic lung disease     COPD     Diabetes ([facility]     Diabetes mellitus ([facility]     History of stomach ulcers     HTN (hypertension)     Hypertension     Hypothyroid     ALEXA (obstructive sleep apnea)     Other and unspecified hyperlipidemia     Personal history of tobacco use, presenting hazards to health     Sleep disturbance, unspecified      No past surgical history on file. Social History     Social History    Marital status:      Spouse name: N/A    Number of children: N/A    Years of education: N/A     Occupational History    Not on file.      Social History Main Topics    Smoking status: Former Smoker     Packs/day: 1.50     Years: 60.00     Types: Cigarettes     Start date: 9/24/1952     Quit date: 7/1/2016    Smokeless tobacco: Never Used    Alcohol use No    Drug use: No    Sexual activity: Not on file Other Topics Concern    Not on file     Social History Narrative     Family History   Problem Relation Age of Onset    Diabetes Other     Breast Cancer Sister     Cancer Brother        Review of systems:  She denies fever chills poor appetite or weight loss    Physical Exam:  Visit Vitals    /60 (BP 1 Location: Left arm, BP Patient Position: Sitting)    Pulse 85    Temp 97.5 °F (36.4 °C) (Oral)    Resp 15    Ht 4' 7\" (1.397 m)    Wt 91.2 kg (201 lb)    SpO2 95%    BMI 46.72 kg/m2       Well-developed and elderly  HEENT: WNL  Lymph node exam: Supraclavicular or cervical lymph nodes negative  Chest: Equal symmetrical expansion no dullness and absent to wheezes rales rubs  Heart: Regular rhythm no gallop no murmur no JVD 1+ bilateral pretibial peripheral edema  Extremities: No cyanosis clubbing or calf tenderness. Neurological: Alert and oriented    LABS:  Echocardiogram 2/21/17: No evidence of pulmonary hypertension  O2 sat 95% room air rest    Impression:   COPD stable  ALEXA with only fair compliance    Plan:  The patient was encouraged to use her CPAP  Continue Advair and when necessary albuterol  Followup in 6 months or sooner as needed    Jennifer Aquino MD , [facility]    CC: Pedrito Crockett MD     [address] [facility]  [place], [address] [address] [address]     P: [phone]     F: [phone]

## 2017-05-16 NOTE — PATIENT INSTRUCTIONS
Always use her CPAP at night with sleep  Continue Advair 2 puffs twice daily and remember to wash mouth with water and spit it out after inhaling Advair  Albuterol by nebulization or by hand-held inhaler (2 puffs) every 4 hours as needed and if you require albuterol too often to control respiratory symptoms call the office for severe symptoms go to the emergency room  Always call for symptoms such as increasing shortness of breath or cough productive of discolored mucus

## 2017-07-11 ENCOUNTER — PATIENT OUTREACH (OUTPATIENT)
Dept: PULMONOLOGY | Age: 77
End: 2017-07-11

## 2017-08-05 ENCOUNTER — APPOINTMENT (OUTPATIENT)
Dept: CT IMAGING | Age: 77
End: 2017-08-05
Attending: EMERGENCY MEDICINE
Payer: MEDICARE

## 2017-08-05 ENCOUNTER — APPOINTMENT (OUTPATIENT)
Dept: CT IMAGING | Age: 77
End: 2017-08-05
Attending: INTERNAL MEDICINE
Payer: MEDICARE

## 2017-08-05 ENCOUNTER — HOSPITAL ENCOUNTER (OUTPATIENT)
Age: 77
Setting detail: OBSERVATION
Discharge: NURSING, REHAB OR LONG-TERM CARE | End: 2017-08-08
Attending: EMERGENCY MEDICINE | Admitting: HOSPITALIST
Payer: MEDICARE

## 2017-08-05 ENCOUNTER — APPOINTMENT (OUTPATIENT)
Dept: GENERAL RADIOLOGY | Age: 77
End: 2017-08-05
Attending: EMERGENCY MEDICINE
Payer: MEDICARE

## 2017-08-05 DIAGNOSIS — E83.42 HYPOMAGNESEMIA: ICD-10-CM

## 2017-08-05 DIAGNOSIS — S00.81XA FACIAL ABRASION, INITIAL ENCOUNTER: ICD-10-CM

## 2017-08-05 DIAGNOSIS — R55 SYNCOPE, UNSPECIFIED SYNCOPE TYPE: Primary | ICD-10-CM

## 2017-08-05 LAB
ALBUMIN SERPL BCP-MCNC: 3.6 G/DL (ref 3.4–5)
ALBUMIN/GLOB SERPL: 1 {RATIO} (ref 0.8–1.7)
ALP SERPL-CCNC: 65 U/L (ref 45–117)
ALT SERPL-CCNC: 14 U/L (ref 13–56)
ANION GAP BLD CALC-SCNC: 7 MMOL/L (ref 3–18)
APPEARANCE UR: CLEAR
APTT PPP: 35.4 SEC (ref 23–36.4)
AST SERPL W P-5'-P-CCNC: 14 U/L (ref 15–37)
ATRIAL RATE: 68 BPM
BASOPHILS # BLD AUTO: 0 K/UL (ref 0–0.06)
BASOPHILS # BLD: 0 % (ref 0–2)
BILIRUB SERPL-MCNC: 0.2 MG/DL (ref 0.2–1)
BILIRUB UR QL: NEGATIVE
BNP SERPL-MCNC: 91 PG/ML (ref 0–1800)
BUN SERPL-MCNC: 23 MG/DL (ref 7–18)
BUN/CREAT SERPL: 29 (ref 12–20)
CALCIUM SERPL-MCNC: 9.1 MG/DL (ref 8.5–10.1)
CALCULATED P AXIS, ECG09: 52 DEGREES
CALCULATED R AXIS, ECG10: -56 DEGREES
CALCULATED T AXIS, ECG11: -14 DEGREES
CHLORIDE SERPL-SCNC: 100 MMOL/L (ref 100–108)
CK MB CFR SERPL CALC: 1.4 % (ref 0–4)
CK MB CFR SERPL CALC: 2 % (ref 0–4)
CK MB CFR SERPL CALC: 2.2 % (ref 0–4)
CK MB SERPL-MCNC: 1.9 NG/ML (ref 5–25)
CK MB SERPL-MCNC: 2.7 NG/ML (ref 5–25)
CK MB SERPL-MCNC: 3 NG/ML (ref 5–25)
CK SERPL-CCNC: 136 U/L (ref 26–192)
CK SERPL-CCNC: 137 U/L (ref 26–192)
CK SERPL-CCNC: 140 U/L (ref 26–192)
CO2 SERPL-SCNC: 29 MMOL/L (ref 21–32)
COLOR UR: YELLOW
CREAT SERPL-MCNC: 0.78 MG/DL (ref 0.6–1.3)
D DIMER PPP FEU-MCNC: 0.89 UG/ML(FEU)
DIAGNOSIS, 93000: NORMAL
DIFFERENTIAL METHOD BLD: ABNORMAL
EOSINOPHIL # BLD: 0.3 K/UL (ref 0–0.4)
EOSINOPHIL NFR BLD: 3 % (ref 0–5)
ERYTHROCYTE [DISTWIDTH] IN BLOOD BY AUTOMATED COUNT: 17.3 % (ref 11.6–14.5)
FERRITIN SERPL-MCNC: 26 NG/ML (ref 8–388)
FOLATE SERPL-MCNC: 17.3 NG/ML (ref 3.1–17.5)
GLOBULIN SER CALC-MCNC: 3.7 G/DL (ref 2–4)
GLUCOSE BLD STRIP.AUTO-MCNC: 119 MG/DL (ref 70–110)
GLUCOSE BLD STRIP.AUTO-MCNC: 122 MG/DL (ref 70–110)
GLUCOSE BLD STRIP.AUTO-MCNC: 144 MG/DL (ref 70–110)
GLUCOSE SERPL-MCNC: 106 MG/DL (ref 74–99)
GLUCOSE UR STRIP.AUTO-MCNC: NEGATIVE MG/DL
HCT VFR BLD AUTO: 33.9 % (ref 35–45)
HGB BLD-MCNC: 10.7 G/DL (ref 12–16)
HGB UR QL STRIP: NEGATIVE
INR PPP: 1 (ref 0.8–1.2)
IRON SATN MFR SERPL: 13 %
IRON SERPL-MCNC: 46 UG/DL (ref 50–175)
KETONES UR QL STRIP.AUTO: NEGATIVE MG/DL
LEUKOCYTE ESTERASE UR QL STRIP.AUTO: NEGATIVE
LYMPHOCYTES # BLD AUTO: 35 % (ref 21–52)
LYMPHOCYTES # BLD: 3 K/UL (ref 0.9–3.6)
MAGNESIUM SERPL-MCNC: 1.6 MG/DL (ref 1.6–2.6)
MCH RBC QN AUTO: 22 PG (ref 24–34)
MCHC RBC AUTO-ENTMCNC: 31.6 G/DL (ref 31–37)
MCV RBC AUTO: 69.8 FL (ref 74–97)
MONOCYTES # BLD: 0.8 K/UL (ref 0.05–1.2)
MONOCYTES NFR BLD AUTO: 10 % (ref 3–10)
NEUTS SEG # BLD: 4.5 K/UL (ref 1.8–8)
NEUTS SEG NFR BLD AUTO: 52 % (ref 40–73)
NITRITE UR QL STRIP.AUTO: NEGATIVE
P-R INTERVAL, ECG05: 198 MS
PH UR STRIP: 7 [PH] (ref 5–8)
PLATELET # BLD AUTO: 240 K/UL (ref 135–420)
PMV BLD AUTO: 11 FL (ref 9.2–11.8)
POTASSIUM SERPL-SCNC: 4.3 MMOL/L (ref 3.5–5.5)
PROT SERPL-MCNC: 7.3 G/DL (ref 6.4–8.2)
PROT UR STRIP-MCNC: NEGATIVE MG/DL
PROTHROMBIN TIME: 13.3 SEC (ref 11.5–15.2)
Q-T INTERVAL, ECG07: 446 MS
QRS DURATION, ECG06: 148 MS
QTC CALCULATION (BEZET), ECG08: 474 MS
RBC # BLD AUTO: 4.86 M/UL (ref 4.2–5.3)
SODIUM SERPL-SCNC: 136 MMOL/L (ref 136–145)
SP GR UR REFRACTOMETRY: 1.01 (ref 1–1.03)
TIBC SERPL-MCNC: 368 UG/DL (ref 250–450)
TROPONIN I SERPL-MCNC: <0.02 NG/ML (ref 0–0.04)
TROPONIN I SERPL-MCNC: <0.02 NG/ML (ref 0–0.04)
TROPONIN I SERPL-MCNC: <0.02 NG/ML (ref 0–0.06)
TSH SERPL DL<=0.05 MIU/L-ACNC: 1.19 UIU/ML (ref 0.36–3.74)
UROBILINOGEN UR QL STRIP.AUTO: 0.2 EU/DL (ref 0.2–1)
VENTRICULAR RATE, ECG03: 68 BPM
VIT B12 SERPL-MCNC: 200 PG/ML (ref 211–911)
WBC # BLD AUTO: 8.6 K/UL (ref 4.6–13.2)

## 2017-08-05 PROCEDURE — 71010 XR CHEST PORT: CPT

## 2017-08-05 PROCEDURE — 74011250636 HC RX REV CODE- 250/636: Performed by: EMERGENCY MEDICINE

## 2017-08-05 PROCEDURE — 70450 CT HEAD/BRAIN W/O DYE: CPT

## 2017-08-05 PROCEDURE — 96366 THER/PROPH/DIAG IV INF ADDON: CPT

## 2017-08-05 PROCEDURE — 99218 HC RM OBSERVATION: CPT

## 2017-08-05 PROCEDURE — 74011250636 HC RX REV CODE- 250/636: Performed by: INTERNAL MEDICINE

## 2017-08-05 PROCEDURE — 94640 AIRWAY INHALATION TREATMENT: CPT

## 2017-08-05 PROCEDURE — 93880 EXTRACRANIAL BILAT STUDY: CPT

## 2017-08-05 PROCEDURE — 82962 GLUCOSE BLOOD TEST: CPT

## 2017-08-05 PROCEDURE — 82728 ASSAY OF FERRITIN: CPT | Performed by: INTERNAL MEDICINE

## 2017-08-05 PROCEDURE — 83735 ASSAY OF MAGNESIUM: CPT | Performed by: EMERGENCY MEDICINE

## 2017-08-05 PROCEDURE — 85610 PROTHROMBIN TIME: CPT | Performed by: EMERGENCY MEDICINE

## 2017-08-05 PROCEDURE — 81003 URINALYSIS AUTO W/O SCOPE: CPT | Performed by: EMERGENCY MEDICINE

## 2017-08-05 PROCEDURE — 90715 TDAP VACCINE 7 YRS/> IM: CPT | Performed by: EMERGENCY MEDICINE

## 2017-08-05 PROCEDURE — 80053 COMPREHEN METABOLIC PANEL: CPT | Performed by: EMERGENCY MEDICINE

## 2017-08-05 PROCEDURE — 84443 ASSAY THYROID STIM HORMONE: CPT | Performed by: EMERGENCY MEDICINE

## 2017-08-05 PROCEDURE — 96372 THER/PROPH/DIAG INJ SC/IM: CPT

## 2017-08-05 PROCEDURE — 74011000250 HC RX REV CODE- 250: Performed by: INTERNAL MEDICINE

## 2017-08-05 PROCEDURE — 71275 CT ANGIOGRAPHY CHEST: CPT

## 2017-08-05 PROCEDURE — 82607 VITAMIN B-12: CPT | Performed by: INTERNAL MEDICINE

## 2017-08-05 PROCEDURE — 85379 FIBRIN DEGRADATION QUANT: CPT | Performed by: INTERNAL MEDICINE

## 2017-08-05 PROCEDURE — 99285 EMERGENCY DEPT VISIT HI MDM: CPT

## 2017-08-05 PROCEDURE — 83880 ASSAY OF NATRIURETIC PEPTIDE: CPT | Performed by: INTERNAL MEDICINE

## 2017-08-05 PROCEDURE — 85025 COMPLETE CBC W/AUTO DIFF WBC: CPT | Performed by: EMERGENCY MEDICINE

## 2017-08-05 PROCEDURE — 74011636320 HC RX REV CODE- 636/320: Performed by: INTERNAL MEDICINE

## 2017-08-05 PROCEDURE — 83540 ASSAY OF IRON: CPT | Performed by: INTERNAL MEDICINE

## 2017-08-05 PROCEDURE — 96365 THER/PROPH/DIAG IV INF INIT: CPT

## 2017-08-05 PROCEDURE — 90471 IMMUNIZATION ADMIN: CPT

## 2017-08-05 PROCEDURE — 82550 ASSAY OF CK (CPK): CPT | Performed by: EMERGENCY MEDICINE

## 2017-08-05 PROCEDURE — 36415 COLL VENOUS BLD VENIPUNCTURE: CPT | Performed by: INTERNAL MEDICINE

## 2017-08-05 PROCEDURE — 93970 EXTREMITY STUDY: CPT

## 2017-08-05 PROCEDURE — 93005 ELECTROCARDIOGRAM TRACING: CPT

## 2017-08-05 PROCEDURE — 74011250637 HC RX REV CODE- 250/637: Performed by: INTERNAL MEDICINE

## 2017-08-05 PROCEDURE — 85730 THROMBOPLASTIN TIME PARTIAL: CPT | Performed by: EMERGENCY MEDICINE

## 2017-08-05 RX ORDER — GABAPENTIN 300 MG/1
300 CAPSULE ORAL 3 TIMES DAILY
Status: DISCONTINUED | OUTPATIENT
Start: 2017-08-05 | End: 2017-08-08 | Stop reason: HOSPADM

## 2017-08-05 RX ORDER — PANTOPRAZOLE SODIUM 40 MG/1
40 TABLET, DELAYED RELEASE ORAL DAILY
Status: DISCONTINUED | OUTPATIENT
Start: 2017-08-05 | End: 2017-08-08 | Stop reason: HOSPADM

## 2017-08-05 RX ORDER — POLYETHYLENE GLYCOL 3350 17 G/17G
17 POWDER, FOR SOLUTION ORAL DAILY
Status: DISCONTINUED | OUTPATIENT
Start: 2017-08-05 | End: 2017-08-08 | Stop reason: HOSPADM

## 2017-08-05 RX ORDER — MAGNESIUM SULFATE 100 %
4 CRYSTALS MISCELLANEOUS AS NEEDED
Status: DISCONTINUED | OUTPATIENT
Start: 2017-08-05 | End: 2017-08-08 | Stop reason: HOSPADM

## 2017-08-05 RX ORDER — ASPIRIN 81 MG/1
81 TABLET ORAL DAILY
Status: DISCONTINUED | OUTPATIENT
Start: 2017-08-05 | End: 2017-08-08 | Stop reason: HOSPADM

## 2017-08-05 RX ORDER — CLONIDINE HYDROCHLORIDE 0.1 MG/1
0.1 TABLET ORAL 2 TIMES DAILY
Status: DISCONTINUED | OUTPATIENT
Start: 2017-08-05 | End: 2017-08-08 | Stop reason: HOSPADM

## 2017-08-05 RX ORDER — DEXTROSE 50 % IN WATER (D50W) INTRAVENOUS SYRINGE
25-50 AS NEEDED
Status: DISCONTINUED | OUTPATIENT
Start: 2017-08-05 | End: 2017-08-08 | Stop reason: HOSPADM

## 2017-08-05 RX ORDER — ALBUTEROL SULFATE 0.83 MG/ML
2.5 SOLUTION RESPIRATORY (INHALATION)
Status: DISCONTINUED | OUTPATIENT
Start: 2017-08-05 | End: 2017-08-08 | Stop reason: HOSPADM

## 2017-08-05 RX ORDER — BUDESONIDE AND FORMOTEROL FUMARATE DIHYDRATE 160; 4.5 UG/1; UG/1
2 AEROSOL RESPIRATORY (INHALATION)
Status: DISCONTINUED | OUTPATIENT
Start: 2017-08-05 | End: 2017-08-08 | Stop reason: HOSPADM

## 2017-08-05 RX ORDER — MAGNESIUM SULFATE HEPTAHYDRATE 40 MG/ML
2 INJECTION, SOLUTION INTRAVENOUS
Status: COMPLETED | OUTPATIENT
Start: 2017-08-05 | End: 2017-08-05

## 2017-08-05 RX ORDER — TRAMADOL HYDROCHLORIDE 50 MG/1
50 TABLET ORAL
Status: DISCONTINUED | OUTPATIENT
Start: 2017-08-05 | End: 2017-08-08 | Stop reason: HOSPADM

## 2017-08-05 RX ORDER — HEPARIN SODIUM 5000 [USP'U]/ML
5000 INJECTION, SOLUTION INTRAVENOUS; SUBCUTANEOUS EVERY 8 HOURS
Status: DISCONTINUED | OUTPATIENT
Start: 2017-08-05 | End: 2017-08-08 | Stop reason: HOSPADM

## 2017-08-05 RX ORDER — INSULIN LISPRO 100 [IU]/ML
INJECTION, SOLUTION INTRAVENOUS; SUBCUTANEOUS
Status: DISCONTINUED | OUTPATIENT
Start: 2017-08-05 | End: 2017-08-08 | Stop reason: HOSPADM

## 2017-08-05 RX ORDER — CYANOCOBALAMIN 1000 UG/ML
1000 INJECTION, SOLUTION INTRAMUSCULAR; SUBCUTANEOUS DAILY
Status: COMPLETED | OUTPATIENT
Start: 2017-08-06 | End: 2017-08-06

## 2017-08-05 RX ORDER — SIMVASTATIN 20 MG/1
20 TABLET, FILM COATED ORAL
Status: DISCONTINUED | OUTPATIENT
Start: 2017-08-05 | End: 2017-08-08 | Stop reason: HOSPADM

## 2017-08-05 RX ORDER — NIFEDIPINE 30 MG/1
30 TABLET, EXTENDED RELEASE ORAL DAILY
Status: DISCONTINUED | OUTPATIENT
Start: 2017-08-05 | End: 2017-08-08 | Stop reason: HOSPADM

## 2017-08-05 RX ORDER — NYSTATIN 100000 U/G
CREAM TOPICAL 3 TIMES DAILY
Status: DISCONTINUED | OUTPATIENT
Start: 2017-08-05 | End: 2017-08-08 | Stop reason: HOSPADM

## 2017-08-05 RX ADMIN — ALBUTEROL SULFATE 2.5 MG: 2.5 SOLUTION RESPIRATORY (INHALATION) at 19:47

## 2017-08-05 RX ADMIN — HEPARIN SODIUM 5000 UNITS: 5000 INJECTION, SOLUTION INTRAVENOUS; SUBCUTANEOUS at 10:13

## 2017-08-05 RX ADMIN — GABAPENTIN 300 MG: 300 CAPSULE ORAL at 17:31

## 2017-08-05 RX ADMIN — NIFEDIPINE 30 MG: 30 TABLET, FILM COATED, EXTENDED RELEASE ORAL at 10:12

## 2017-08-05 RX ADMIN — ALBUTEROL SULFATE 2.5 MG: 2.5 SOLUTION RESPIRATORY (INHALATION) at 09:27

## 2017-08-05 RX ADMIN — BUDESONIDE AND FORMOTEROL FUMARATE DIHYDRATE 2 PUFF: 160; 4.5 AEROSOL RESPIRATORY (INHALATION) at 19:48

## 2017-08-05 RX ADMIN — IOPAMIDOL 50 ML: 755 INJECTION, SOLUTION INTRAVENOUS at 18:00

## 2017-08-05 RX ADMIN — SIMVASTATIN 20 MG: 20 TABLET, FILM COATED ORAL at 21:55

## 2017-08-05 RX ADMIN — GABAPENTIN 300 MG: 300 CAPSULE ORAL at 21:55

## 2017-08-05 RX ADMIN — CLONIDINE HYDROCHLORIDE 0.1 MG: 0.1 TABLET ORAL at 17:31

## 2017-08-05 RX ADMIN — ASPIRIN 81 MG: 81 TABLET, COATED ORAL at 10:12

## 2017-08-05 RX ADMIN — TETANUS TOXOID, REDUCED DIPHTHERIA TOXOID AND ACELLULAR PERTUSSIS VACCINE, ADSORBED 0.5 ML: 5; 2.5; 8; 8; 2.5 SUSPENSION INTRAMUSCULAR at 07:51

## 2017-08-05 RX ADMIN — PANTOPRAZOLE SODIUM 40 MG: 40 TABLET, DELAYED RELEASE ORAL at 10:12

## 2017-08-05 RX ADMIN — MAGNESIUM SULFATE IN WATER 2 G: 40 INJECTION, SOLUTION INTRAVENOUS at 07:26

## 2017-08-05 RX ADMIN — POLYETHYLENE GLYCOL 3350 17 G: 17 POWDER, FOR SOLUTION ORAL at 10:12

## 2017-08-05 RX ADMIN — CLONIDINE HYDROCHLORIDE 0.1 MG: 0.1 TABLET ORAL at 10:12

## 2017-08-05 RX ADMIN — ALBUTEROL SULFATE 2.5 MG: 2.5 SOLUTION RESPIRATORY (INHALATION) at 14:45

## 2017-08-05 RX ADMIN — GABAPENTIN 300 MG: 300 CAPSULE ORAL at 10:12

## 2017-08-05 RX ADMIN — HEPARIN SODIUM 5000 UNITS: 5000 INJECTION, SOLUTION INTRAVENOUS; SUBCUTANEOUS at 17:31

## 2017-08-05 NOTE — H&P
History & Physical    Patient: Shaneka Jorgensen MRN: [id number]  [facility]: [id number]    YOB: 1940  Age: 76 y.o. Sex: female      DOA: 8/5/2017     CC: Fall and passed out  HPI:     Shaneka Jorgensen is a 76 y.o. female with significant medical co-morbidities listed below presented from home with recurrent falls. Accordingly, she was in her normal state last night but she started to feel weak in her knees around bedtime. She fell to the floor with   no apparent injury. She attempted to get herself up but was unable to do so. The fight department picked her up but she declined to be   evaluated in the ER. During this episode, she denied headache, chest pain, chest palpitation, no dizziness. She fell again this AM and   she injured her left forehead. During the second fall, she denied dizziness or palpitation, she stated that she \"just blacked out. \"    She has been adherent to all her medical care. She lives alone at home. She has had several falls outpatient, which was deemed related   to her arthritis. She was scheduled for PT at home. Over the last week, she feels her leg calf is more pain full, mild swollen than right leg. In the ER, CT head showed no acute pathology. EKG with no heart block or arrhythmia. Chest xray with no acute pathology. Cardiac enzyme was negative. She was place on OBS for monitoring.      ROS: pain in left eyebrow, no dizziness, no pain in her sinus, no visual change, no swallowing problem, no chest pain, no  Palpitation,  No shortness of breath, no abd pain, no diarrhea, +constipation, +left leg swelling, no hip pain    Past Medical History:   Diagnosis Date    Asthma     COPD     [facility] [facility]     Diastolic heart failure [facility] [address] [facility]     Dyslipidemia associated with type 2 diabetes mellitus ([facility] [address] [facility]     History of stomach ulcers     Hypertension     Hypothyroidism     Low back pain     Morbid obesity [facility] [address]     ALEXA (obstructive sleep apnea)  Personal history of tobacco use, presenting hazards to health     Sleep disturbance, unspecified     Type 2 diabetes mellitus with diabetic polyneuropathy (HCC)     Venous insufficiency (chronic) (peripheral)        History reviewed. No pertinent surgical history. Family History   Problem Relation Age of Onset    Diabetes Other     Breast Cancer Sister     Cancer Brother        Social History     Social History    Marital status:      Spouse name: N/A    Number of children: N/A    Years of education: N/A     Social History Main Topics    Smoking status: Former Smoker     Packs/day: 1.50     Years: 60.00     Types: Cigarettes     Start date: 9/24/1952     Quit date: 7/1/2016    Smokeless tobacco: Never Used    Alcohol use No    Drug use: No    Sexual activity: Not Asked     Other Topics Concern    None     Social History Narrative       Prior to Admission medications    Medication Sig Start Date End Date Taking? Authorizing Provider   traMADol (ULTRAM) 50 mg tablet Take 50 mg by mouth every six (6) hours as needed for Pain. Yes Historical Provider   loratadine (CLARITIN) 10 mg tablet Take 10 mg by mouth daily as needed for Other. One daily HS prn   Yes Historical Provider   cpap machine kit by Does Not Apply route. Change to Auto CPAP 5-20 and please provide new mask. Lots of leak on current Mask. 1/24/17  Yes Alfredo Barahona MD   nystatin (MYCOSTATIN) topical cream Apply 1 g to affected area two (2) times a day. 5/25/16  Yes Noris Garcia MD   acetaminophen (TYLENOL) 500 mg tablet Take 500 mg by mouth every six (6) hours as needed for Pain. Yes Historical Provider   aspirin delayed-release 81 mg tablet Take 81 mg by mouth daily. Yes Historical Provider   pantoprazole (PROTONIX) 40 mg tablet Take 40 mg by mouth daily. Indications: GASTROESOPHAGEAL REFLUX   Yes Historical Provider   ergocalciferol (ERGOCALCIFEROL) 50,000 unit capsule Take 50,000 Units by mouth every seven (7) days. Indications: VITAMIN D DEFICIENCY (HIGH DOSE THERAPY)   Yes Historical Provider   ipratropium (ATROVENT) 0.02 % nebulizer solution 0.5 mg by Nebulization route as needed for Wheezing. Yes Historical Provider   albuterol (PROVENTIL HFA, VENTOLIN HFA, PROAIR HFA) 90 mcg/actuation inhaler Take  by inhalation every six (6) hours as needed for Wheezing. Yes Historical Provider   Calcium Carbonate-Vit D3-Min 600 mg calcium- 400 unit tab Take 1 Tab by mouth two (2) times a day. Yes Historical Provider   gabapentin (NEURONTIN) 300 mg capsule Take 300 mg by mouth three (3) times daily. Yes Jeannette Lindo MD   NIFEdipine ER (ADALAT CC) 30 mg ER tablet Take  by mouth daily. Yes Jeannette Lindo MD   cloNIDine (CATAPRES) 0.1 mg tablet Take 0.1 mg by mouth two (2) times a day. Yes Jeannette Lindo MD   POTASSIUM CHLORIDE SR 10 MEQ TAB     Yes Historical Provider   furosemide (LASIX) 20 mg tablet Take  by mouth daily. Yes Historical Provider   simvastatin (ZOCOR) 40 mg tablet Take 20 mg by mouth nightly. Yes Historical Provider   metFORMIN (GLUCOPHAGE) 500 mg tablet Take 1,000 mg by mouth two (2) times daily (with meals). Yes Historical Provider   fluticasone-salmeterol (ADVAIR DISKUS) 250-50 mcg/dose diskus inhaler Take 1 Puff by inhalation every twelve (12) hours. Yes Historical Provider   albuterol (PROVENTIL VENTOLIN) 2.5 mg /3 mL (0.083 %) nebulizer solution by Nebulization route once.      Yes Jeannette Lindo MD       Allergies   Allergen Reactions    Lisinopril Angioedema     Document from PCP record              Physical Exam:      Visit Vitals    /60 (BP 1 Location: Left arm, BP Patient Position: At rest)    Pulse 68    Temp 98 °F (36.7 °C)    Resp 18    Ht 4' 7\" (1.397 m)    Wt 90.7 kg (200 lb)    SpO2 95%    BMI 46.48 kg/m2       Physical Exam:  GENERAL: Cooperative, not in distress, speaks in full sentence  HEENT: PERRL, EOMI, left lateral eyebrow with laceration/contusion, dry oral mucosa, no neck pain  LUNG: mild crackle at lung bases, no wheezing  HEART: S1S2 regular, no rub/gallop  ABDOMEN: Obese, non tender, non distended, +bs  EXTREMITIES:  Left caft tenderness with mild edema, right leg with no edema, distal pulses appreciated   SKIN: candida dermatitis in her pannus, mild abrasion/laceration in her left eyebrow  NEUROLOGIC: AOx3, moving all her extremities, no gross deficit     Lab/Data Review:  Labs: Results:       Chemistry Recent Labs      08/05/17   0610   GLU  106*   NA  136   K  4.3   CL  100   CO2  29   BUN  23*   CREA  0.78   CA  9.1   AGAP  7   BUCR  29*   AP  65   TP  7.3   ALB  3.6   GLOB  3.7   AGRAT  1.0      CBC w/Diff Recent Labs      08/05/17   0610   WBC  8.6   RBC  4.86   HGB  10.7*   HCT  33.9*   PLT  240   GRANS  52   LYMPH  35   EOS  3      Coagulation Recent Labs      08/05/17   0610   PTP  13.3   INR  1.0   APTT  35.4       Iron/Ferritin    BNP    Cardiac Enzymes Recent Labs      08/05/17   0610   CPK  140   CKND1  1.4      Liver Enzymes Recent Labs      08/05/17   0610   TP  7.3   ALB  3.6   AP  65   SGOT  14*      Thyroid Studies Lab Results   Component Value Date/Time    TSH 1.19 08/05/2017 06:10 AM          All Micro Results     None          Imaging Reviewed:  CT head:  IMPRESSION:     1. No clearly acute findings. Of note, MRI is more sensitive in the detection of  acute infarct.     2. Mild periventricular white matter low-attenuation, likely chronic  microvascular ischemic change. Suspected small remote lacunar infarcts in the  basal ganglia region bilaterally. Chest Xray:  IMPRESSION:        1. Enlarged cardiac silhouette. Suspected interstitial infiltrate/edema and  possible left pleural effusion and/or infiltrate. Some of these findings may be  exaggerated by low lung volumes.         Assessment/Plan:     1)  Syncope with collapse       - placed on telemetry and monitor her cardiac enzyme      - check Orthostatic BP      - check duplex carotid      - Her last Echo in [facility] with normal EF 60%, no valvular pathology, will hold off repeating Echo    2)  Left leg pain and swelling      - check D-dimer, added to AM lab      - check Duplex of legs      - check proBNP    3)  Left forehead laceration from fall, symptomatic care    4)  COPD/Asthma      - stable with no exacerbation. She continues bronchodilators, Advair converts to Symbicort     5)  ALEXA - she is adherent to her CPAP, will continue for QHS    6)  Type 2 DM - hold her Metformin, will continue with ISS. Diabetic diet today    7)  HTN - she is on Nifedipine, Clonidine, and Lasix.  Hold off lasix for today    8)  Chronic diastolic heart failure, no clinical sign of decompensation      - holding her lasix for today      - check proBNP in the setting of her left leg swelling    9)  Hyperlipidemia - continue statin     10)  Microcytic anemia, check for stool occult blood      - re-check iron panel, vitamin B12, folate        DVT prophylaxis: hep SQ  GI prophylaxis: PPI  Partial Code, DNI    Bonifacio Gresham MD  8/5/2017, 8:15 AM

## 2017-08-05 NOTE — ED PROVIDER NOTES
HPI Comments: Grabiel Coon is a 76 y.o. female presents to ED via EMS c/o syncope just pta . Reports \"I was going to take my medications and then I was on the floor. \" Denies preceding CP, SOB or palpitations. Also denies preceding lightheadedness, dizziness or any tripping/slipping. Pt lives alone and actually had already fallen tonight with the [facility] medics picking her up off the floor once tonight. PMHx includes hypertension, diabetes, COPD, asthma, and any other symptoms or complaints. Denies tripping, light-headedness, chest pain, neck pain, leg swelling, nausea, vomiting, diarrhea, and any other symptoms or complaints. Past Medical History:   Diagnosis Date    Asthma     Chronic lung disease     COPD     Diabetes ([facility] [address]     Diabetes mellitus ([facility] [address]     History of stomach ulcers     HTN (hypertension)     Hypertension     Hypothyroid     ALEXA (obstructive sleep apnea)     Other and unspecified hyperlipidemia     Personal history of tobacco use, presenting hazards to health     Sleep disturbance, unspecified        History reviewed. No pertinent surgical history. Family History:   Problem Relation Age of Onset    Diabetes Other     Breast Cancer Sister     Cancer Brother        Social History     Social History    Marital status:      Spouse name: N/A    Number of children: N/A    Years of education: N/A     Occupational History    Not on file. Social History Main Topics    Smoking status: Former Smoker     Packs/day: 1.50     Years: 60.00     Types: Cigarettes     Start date: 9/24/1952     Quit date: 7/1/2016    Smokeless tobacco: Never Used    Alcohol use No    Drug use: No    Sexual activity: Not on file     Other Topics Concern    Not on file     Social History Narrative         ALLERGIES: Lisinopril    Review of Systems   Constitutional: Negative. Negative for appetite change, chills and fever. HENT: Negative.   Negative for congestion, sore throat and trouble swallowing. Eyes: Negative. Negative for visual disturbance. Respiratory: Negative. Negative for cough, shortness of breath and wheezing. Cardiovascular: Negative. Negative for chest pain, palpitations and leg swelling. Gastrointestinal: Negative. Negative for abdominal pain, blood in stool, diarrhea and vomiting. Genitourinary: Negative. Negative for difficulty urinating, dysuria, frequency and vaginal discharge. Musculoskeletal: Negative. Negative for back pain and myalgias. Skin: Positive for wound (abrasion over left eye ). Negative for rash. Neurological: Positive for syncope. Negative for dizziness, speech difficulty, weakness, light-headedness, numbness and headaches. Psychiatric/Behavioral: Negative. Negative for hallucinations, self-injury and suicidal ideas. All other systems reviewed and are negative. Vitals:    08/05/17 0502 08/05/17 0616   BP: 129/60    Pulse: 68    Resp: 18    Temp: 98 °F (36.7 °C)    SpO2: 95% 95%   Weight: 90.7 kg (200 lb)    Height: 4' 7\" (1.397 m)             Physical Exam   Constitutional: She is oriented to person, place, and time. She appears well-developed and well-nourished. No distress. HENT:   Head: Normocephalic and atraumatic. Mouth/Throat: Oropharynx is clear and moist.   Eyes: Conjunctivae and EOM are normal. Pupils are equal, round, and reactive to light. No scleral icterus. Neck: Trachea normal and normal range of motion. Neck supple. No JVD present. No thyromegaly present. Cardiovascular: Normal rate, regular rhythm, S1 normal and S2 normal.  Exam reveals no gallop and no friction rub. No murmur heard. Pulmonary/Chest: Effort normal and breath sounds normal. No accessory muscle usage. No respiratory distress. Abdominal: Soft. Normal appearance. She exhibits no distension. There is no tenderness. There is no rigidity, no rebound and no guarding. Musculoskeletal: Normal range of motion.  She exhibits no edema or tenderness. Neurological: She is alert and oriented to person, place, and time. She has normal strength. No cranial nerve deficit or sensory deficit. Coordination normal.   Skin: Skin is warm and intact. No rash noted. Mild soft tissue swelling above left eye with dried blood and small abrasion. Psychiatric: She has a normal mood and affect. Her speech is normal. She is combative. Vitals reviewed. MDM  Number of Diagnoses or Management Options  Facial abrasion, initial encounter:   Hypomagnesemia:   Syncope, unspecified syncope type:   Diagnosis management comments: Sushant Cruz is a 76 y.o. Female coming in via EMS after what sounds like a syncopal event. Normal neuro exam, however given lack of prodrome cardiac etiology or arrhythmia is certainly a possibility. Will get basic laboratory w/u and will plan for admission for telemetry and cardiac w/u.     ED Course       Procedures    Vitals:  Patient Vitals for the past 12 hrs:   Temp Pulse Resp BP SpO2   08/05/17 0616 - - - - 95 %   08/05/17 0502 98 °F (36.7 °C) 68 18 129/60 95 %       Medications Ordered:  Medications   magnesium sulfate 2 g/50 ml IVPB (premix or compounded) (not administered)     Lab Findings:  Recent Results (from the past 12 hour(s))   EKG, 12 LEAD, INITIAL    Collection Time: 08/05/17  5:08 AM   Result Value Ref Range    Ventricular Rate 68 BPM    Atrial Rate 68 BPM    P-R Interval 198 ms    QRS Duration 148 ms    Q-T Interval 446 ms    QTC Calculation (Bezet) 474 ms    Calculated P Axis 52 degrees    Calculated R Axis -56 degrees    Calculated T Axis -14 degrees    Diagnosis       Sinus rhythm with premature supraventricular complexes  Right bundle branch block  Left anterior fascicular block  Bifascicular block  Abnormal ECG  When compared with ECG of 21-MAY-2016 06:22,  premature supraventricular complexes are now present  Inverted T waves have replaced nonspecific T wave abnormality in Inferior   leads     CBC WITH AUTOMATED DIFF    Collection Time: 08/05/17  6:10 AM   Result Value Ref Range    WBC 8.6 4.6 - 13.2 K/uL    RBC 4.86 4.20 - 5.30 M/uL    HGB 10.7 (L) 12.0 - 16.0 g/dL    HCT 33.9 (L) 35.0 - 45.0 %    MCV 69.8 (L) 74.0 - 97.0 FL    MCH 22.0 (L) 24.0 - 34.0 PG    MCHC 31.6 31.0 - 37.0 g/dL    RDW 17.3 (H) 11.6 - 14.5 %    PLATELET 167 454 - 524 K/uL    MPV 11.0 9.2 - 11.8 FL    NEUTROPHILS 52 40 - 73 %    LYMPHOCYTES 35 21 - 52 %    MONOCYTES 10 3 - 10 %    EOSINOPHILS 3 0 - 5 %    BASOPHILS 0 0 - 2 %    ABS. NEUTROPHILS 4.5 1.8 - 8.0 K/UL    ABS. LYMPHOCYTES 3.0 0.9 - 3.6 K/UL    ABS. MONOCYTES 0.8 0.05 - 1.2 K/UL    ABS. EOSINOPHILS 0.3 0.0 - 0.4 K/UL    ABS. BASOPHILS 0.0 0.0 - 0.06 K/UL    DF AUTOMATED     METABOLIC PANEL, COMPREHENSIVE    Collection Time: 08/05/17  6:10 AM   Result Value Ref Range    Sodium 136 136 - 145 mmol/L    Potassium 4.3 3.5 - 5.5 mmol/L    Chloride 100 100 - 108 mmol/L    CO2 29 21 - 32 mmol/L    Anion gap 7 3.0 - 18 mmol/L    Glucose 106 (H) 74 - 99 mg/dL    BUN 23 (H) 7.0 - 18 MG/DL    Creatinine 0.78 0.6 - 1.3 MG/DL    BUN/Creatinine ratio 29 (H) 12 - 20      GFR est AA >60 >60 ml/min/1.73m2    GFR est non-AA >60 >60 ml/min/1.73m2    Calcium 9.1 8.5 - 10.1 MG/DL    Bilirubin, total 0.2 0.2 - 1.0 MG/DL    ALT (SGPT) 14 13 - 56 U/L    AST (SGOT) 14 (L) 15 - 37 U/L    Alk.  phosphatase 65 45 - 117 U/L    Protein, total 7.3 6.4 - 8.2 g/dL    Albumin 3.6 3.4 - 5.0 g/dL    Globulin 3.7 2.0 - 4.0 g/dL    A-G Ratio 1.0 0.8 - 1.7     MAGNESIUM    Collection Time: 08/05/17  6:10 AM   Result Value Ref Range    Magnesium 1.6 1.6 - 2.6 mg/dL   CARDIAC PANEL,(CK, CKMB & TROPONIN)    Collection Time: 08/05/17  6:10 AM   Result Value Ref Range     26 - 192 U/L    CK - MB 1.9 <3.6 ng/ml    CK-MB Index 1.4 0.0 - 4.0 %    Troponin-I, Qt. <0.02 0.00 - 0.06 NG/ML   PROTHROMBIN TIME + INR    Collection Time: 08/05/17  6:10 AM   Result Value Ref Range    Prothrombin time 13.3 11.5 - 15.2 sec    INR 1.0 0.8 - 1.2     PTT    Collection Time: 08/05/17  6:10 AM   Result Value Ref Range    aPTT 35.4 23.0 - 36.4 SEC   TSH 3RD GENERATION    Collection Time: 08/05/17  6:10 AM   Result Value Ref Range    TSH 1.19 0.36 - 3.74 uIU/mL       EKG Interpretation by ED physician:  5:17 AM Sinus rhythm. Rate of 68 bpm. Right bundle branch block pattern. No acute ischemic changes. X-ray, CT or radiology findings or impressions:  6:12 AM  Ct Head Wo Cont  FINDINGS: No evidence of acute hemorrhage. Mild periventricular white matter low-attenuation. No abnormal extra-axial collection. Suspected remote lacunar infarct in the right anterior limb internal capsule, and the left lentiform, in the left thalamus, and left anterior internal capsule. Ventricles are normal in size and configuration. Calvarium intact. Imaged paranasal sinuses well-aerated. Mastoid air cells well-aerated. IMPRESSION: 1. No clearly acute findings. Of note, MRI is more sensitive in the detection of acute infarct. 2. Mild periventricular white matter low-attenuation, likely chronic microvascular ischemic change. Suspected small remote lacunar infarcts in the basal ganglia region bilaterally. 6:12 AM  Xr Chest Port  FINDINGS: Frontal view of the chest obtained. Underpenetration and low lung volumes limits evaluation. Moderately enlarged cardiac silhouette. Slightly prominent perihilar and beyond pulmonary interstitium. Possible layering left pleural effusion and/or infiltrate. Aortic arch calcifications. No evidence of pneumothorax. No acute osseous findings. IMPRESSION: 1. Enlarged cardiac silhouette. Suspected interstitial infiltrate/edema and possible left pleural effusion and/or infiltrate. Some of these findings may be exaggerated by low lung volumes.     Progress notes, consult notes, or additional procedure notes:  7:02 AM Consult: I discussed care with Dr. Babita FUNEZ and she agrees with admission and Cardiac Tele observation. Reevaluation of the patient:  Patient continues to be resting comfortably in bed. Hemodynamically stable without any recurrent syncope or other complaints. Discussed all results and need for admission and patient agrees. Diagnosis:   1. Syncope, unspecified syncope type    2. Facial abrasion, initial encounter    3. Hypomagnesemia        Disposition: Admit    Follow-up Information     None           Patient's Medications   Start Taking    No medications on file   Continue Taking    ACETAMINOPHEN (TYLENOL) 500 MG TABLET    Take 500 mg by mouth every six (6) hours as needed for Pain. ALBUTEROL (PROVENTIL HFA, VENTOLIN HFA, PROAIR HFA) 90 MCG/ACTUATION INHALER    Take  by inhalation every six (6) hours as needed for Wheezing. ALBUTEROL (PROVENTIL VENTOLIN) 2.5 MG /3 ML (0.083 %) NEBULIZER SOLUTION    by Nebulization route once. ASPIRIN DELAYED-RELEASE 81 MG TABLET    Take 81 mg by mouth daily. CALCIUM CARBONATE-VIT D3- MG CALCIUM- 400 UNIT TAB    Take 1 Tab by mouth two (2) times a day. CEFPODOXIME (VANTIN) 100 MG TABLET    Take 1 Tab by mouth every twelve (12) hours. Indications: BACTERIAL PNEUMONIA    CLONIDINE (CATAPRES) 0.1 MG TABLET    Take 0.1 mg by mouth two (2) times a day. CPAP MACHINE KIT    by Does Not Apply route. Change to Auto CPAP 5-20 and please provide new mask. Lots of leak on current Mask. ERGOCALCIFEROL (ERGOCALCIFEROL) 50,000 UNIT CAPSULE    Take 50,000 Units by mouth every seven (7) days. Indications: VITAMIN D DEFICIENCY (HIGH DOSE THERAPY)    FLUTICASONE-SALMETEROL (ADVAIR DISKUS) 250-50 MCG/DOSE DISKUS INHALER    Take 1 Puff by inhalation every twelve (12) hours. FUROSEMIDE (LASIX) 20 MG TABLET    Take  by mouth daily. GABAPENTIN (NEURONTIN) 300 MG CAPSULE    Take 300 mg by mouth three (3) times daily. GUAIFENESIN-CODEINE (ROBITUSSIN AC) 100-10 MG/5 ML SOLUTION    Take 5 mL by mouth four (4) times daily as needed for Cough.  Max Daily Amount: 20 mL. Indications: COUGH    IPRATROPIUM (ATROVENT) 0.02 % NEBULIZER SOLUTION    0.5 mg by Nebulization route as needed for Wheezing. LORATADINE (CLARITIN) 10 MG TABLET    Take 10 mg by mouth daily as needed for Other. One daily HS prn    METFORMIN (GLUCOPHAGE) 500 MG TABLET    Take 1,000 mg by mouth two (2) times daily (with meals). NIFEDIPINE ER (ADALAT CC) 30 MG ER TABLET    Take  by mouth daily. NYSTATIN (MYCOSTATIN) TOPICAL CREAM    Apply 1 g to affected area two (2) times a day. PANTOPRAZOLE (PROTONIX) 40 MG TABLET    Take 40 mg by mouth daily. Indications: GASTROESOPHAGEAL REFLUX    POTASSIUM CHLORIDE SR 10 MEQ TAB         SIMVASTATIN (ZOCOR) 40 MG TABLET    Take 20 mg by mouth nightly. TRAMADOL (ULTRAM) 50 MG TABLET    Take 50 mg by mouth every six (6) hours as needed for Pain. These Medications have changed    No medications on file   Stop Taking    No medications on file       Scribe Merle Valentin. acting as a scribe for and in the presence of Nicholas Reynoso MD      August 05, 2017 at 7:11 AM       Provider Attestation:      I personally performed the services described in the documentation, reviewed the documentation, as recorded by the scribe in my presence, and it accurately and completely records my words and actions.  August 05, 2017 at 7:11 AM - Nicholas Reynoso MD

## 2017-08-05 NOTE — PROGRESS NOTES
PT eval order received and chart reviewed. Will hold until results of LE Venous Duplex and Chest CTA available. Thank you for this referral. Tiffanie Fernandez, PT, DPT.

## 2017-08-05 NOTE — ED NOTES
Unknown LOC, states she does not think so. Plan of care explained. Patient agreeable. Face cleaned of dried blood, one small 1 mm laceration noted above left eyebrow. Provider aware. No repair needed. Off to CT.

## 2017-08-05 NOTE — ROUTINE PROCESS
TRANSFER - OUT REPORT:    Verbal report given to [facility] (name) on Olga Public  being transferred to Room 403 for routine progression of care       Report consisted of patients Situation, Background, Assessment and   Recommendations(SBAR). Information from the following report(s) SBAR, Kardex, ED Summary and MAR was reviewed with the receiving nurse. Lines:   Peripheral IV 08/05/17 Right Forearm (Active)   Site Assessment Clean, dry, & intact 8/5/2017  5:24 AM   Phlebitis Assessment 0 8/5/2017  5:24 AM   Infiltration Assessment 0 8/5/2017  5:24 AM   Dressing Status Clean, dry, & intact 8/5/2017  5:24 AM   Hub Color/Line Status Pink 8/5/2017  5:24 AM       Peripheral IV 08/05/17 Left Forearm (Active)   Site Assessment Clean, dry, & intact 8/5/2017  6:31 AM   Phlebitis Assessment 0 8/5/2017  6:31 AM   Infiltration Assessment 0 8/5/2017  6:31 AM   Dressing Status Clean, dry, & intact 8/5/2017  6:31 AM   Dressing Type Tape;Transparent 8/5/2017  6:31 AM   Hub Color/Line Status Flushed;Patent 8/5/2017  6:31 AM   Action Taken Blood drawn 8/5/2017  6:31 AM        Opportunity for questions and clarification was provided.       Patient transported with:   Monitor  Patient-specific medications from Pharmacy (magnesium IV drip)

## 2017-08-05 NOTE — PROCEDURES
[facility]  *** FINAL REPORT ***    Name: Lilly Nicolas  MRN: [id number]    Outpatient  : 28 May 1940  HIS Order #: [id number]  [id number] [facility] Visit #: [id number]  Date: 05 Aug 2017    TYPE OF TEST: Peripheral Venous Testing    REASON FOR TEST  Pain in limb, Limb swelling    Right Leg:-  Deep venous thrombosis:           No  Superficial venous thrombosis:    No  Deep venous insufficiency:        Not examined  Superficial venous insufficiency: Not examined    Left Leg:-  Deep venous thrombosis:           No  Superficial venous thrombosis:    No  Deep venous insufficiency:        Not examined  Superficial venous insufficiency: Not examined      INTERPRETATION/FINDINGS  Duplex images were obtained using 2-D gray scale, color flow, and  spectral Doppler analysis. Right leg :  1. Deep vein(s) visualized include the common femoral, proximal  femoral, mid femoral, distal femoral, popliteal(above knee),  popliteal(fossa), popliteal(below knee), posterior tibial and peroneal   veins. 2. No evidence of deep venous thrombosis detected in the veins  visualized. 3. Superficial vein(s) visualized include the great saphenous vein. 4. No evidence of superficial thrombosis detected. Left leg :  1. Deep vein(s) visualized include the common femoral, proximal  femoral, mid femoral, distal femoral, popliteal(above knee),  popliteal(fossa), popliteal(below knee), posterior tibial and peroneal   veins. 2. No evidence of deep venous thrombosis detected in the veins  visualized. 3. Superficial vein(s) visualized include the great saphenous vein. 4. No evidence of superficial thrombosis detected. ADDITIONAL COMMENTS    I have personally reviewed the data relevant to the interpretation of  this  study. TECHNOLOGIST: Mary Cristina, [facility], RVT/  Signed: 2017 05:11 PM    PHYSICIAN: Dora Stone D.O.   Signed: 2017 11:39 AM

## 2017-08-05 NOTE — ED TRIAGE NOTES
Two episodes of  syncope at home on her way to the bathroom. Bumped her head one time, blood noted at eyebrow.

## 2017-08-05 NOTE — ED NOTES
Assumed care of pt. [facility] after she was incontinent of urine.

## 2017-08-05 NOTE — PROGRESS NOTES
conducted an initial consultation and Spiritual Assessment for Nicolas, who is a 76 y.o.,female. Patients Primary Language is: [place]. According to the patients EMR Hoahaoism Affiliation is: Shirin Irwin.     The reason the Patient came to the hospital is:   Patient Active Problem List    Diagnosis Date Noted    Syncope 08/05/2017    Acute on chronic respiratory failure with hypoxia and hypercapnia ([facility] [address] 05/21/2016    COPD with acute exacerbation ([facility] [address] [facility] 05/21/2016    CAP (community acquired pneumonia) 05/21/2016    COPD (chronic obstructive pulmonary disease) ([facility] [address] 06/12/2014    Fall at home 06/12/2014    Osteoarthritis of both knees 06/12/2014    GERD (gastroesophageal reflux disease) 06/12/2014    Laceration of foot 06/12/2014    Syncope and collapse 06/11/2014    Angioedema 03/29/2014    ALEXA (obstructive sleep apnea)     Airway compromise     Diabetes mellitus ([facility] [address]     HTN (hypertension)         The  provided the following Interventions:  Initiated a relationship of care and support. Explored issues of femi, belief, spirituality and Anabaptism/ritual needs while hospitalized. Listened empathically. Provided chaplaincy education. Provided information about Spiritual Care Services. Offered prayer and assurance of continued prayers on patient's behalf. Chart reviewed. The following outcomes where achieved:  Patient shared limited information about both their medical narrative and spiritual journey/beliefs.  confirmed Patient's Hoahaoism Affiliation. Patient processed feeling about current hospitalization. Patient expressed gratitude for 's visit. Assessment:  Patient does not have any Anabaptism/cultural needs that will affect patients preferences in health care. There are no spiritual or Anabaptism issues which require intervention at this time.      Plan:  Chaplains will continue to follow and will provide pastoral care on an as needed/requested basis.  recommends bedside caregivers page  on duty if patient shows signs of acute spiritual or emotional distress.         [address] [facility].   [phone]

## 2017-08-05 NOTE — PROCEDURES
[facility]  *** [facility]AL RE[facility] ***    Name: Eric Cleaning  MRN: [id number]    Outpatient  : 28 May 1940  HIS Order #: [id number]  [address] [facility] Visit #: [id number]  Date: 05 Aug 2017    TYPE OF TEST: Cerebrovascular Duplex    REASON FOR TEST    Right Carotid:-             Proximal               Mid                 Distal  cm/s  Systolic  Diastolic  Systolic  Diastolic  Systolic  Diastolic  CCA:     27.6      18.0       75.0      14.0      127.0      23.0  Bulb:  ECA:     47.0      12.0  ICA:    127.0      23.0       61.0      16.0       65.0      18.0  ICA/CCA:  1.0       1.0    ICA Stenosis: <50%    Right Vertebral:-  Finding: Antegrade  Sys:       47.0  Lauren:       12.0    Right Subclavian: Normal    Left Carotid:-            Proximal                Mid                 Distal  cm/s  Systolic  Diastolic  Systolic  Diastolic  Systolic  Diastolic  CCA:     57.3     143.0      108.0      14.0      108.0     143.0  Bulb:  ECA:     78.0      12.0  ICA:     78.0      19.0       53.0       9.0       52.0      12.0  ICA/CCA:  0.7       1.4    ICA Stenosis: <50%    Left Vertebral:-  Finding: Antegrade  Sys:       43.0  Lauren:       16.0    Left Subclavian: Normal    INTERPRETATION/FINDINGS  Duplex images were obtained using 2-D gray scale, color flow, and  spectral Doppler analysis. 1. Bilateral <50% stenosis of the internal carotid arteries. 2. No significant stenosis in the external carotid arteries  bilaterally. 3. Antegrade flow in both vertebral arteries. 4. Normal flow in both subclavian arteries. Plaque Morphology:  1. Hyperechoic plaque in the bulb and right ICA. 2. Hyperechoic plaque in the bulb and left ICA. ADDITIONAL COMMENTS  Technically difficult scan due to patient laboured breathing. Moderate calcified plaque noted in left CCA. I have personally reviewed the data relevant to the interpretation of  this  study.     TECHNOLOGIST: Mason Henao, [facility], RVT/  Signed: 2017 05:23 PM    PHYSICIAN: TILA Ulloa   Signed: 08/06/2017 11:39 AM

## 2017-08-05 NOTE — ED NOTES
Patient uncooperative with urine collection. Will not agree to clean catch in bedpan or mini cath . Provider made aware.

## 2017-08-06 LAB
ANION GAP BLD CALC-SCNC: 8 MMOL/L (ref 3–18)
ATRIAL RATE: 71 BPM
BUN SERPL-MCNC: 10 MG/DL (ref 7–18)
BUN/CREAT SERPL: 19 (ref 12–20)
CALCIUM SERPL-MCNC: 9 MG/DL (ref 8.5–10.1)
CALCULATED P AXIS, ECG09: 47 DEGREES
CALCULATED R AXIS, ECG10: -58 DEGREES
CALCULATED T AXIS, ECG11: -15 DEGREES
CHLORIDE SERPL-SCNC: 103 MMOL/L (ref 100–108)
CO2 SERPL-SCNC: 28 MMOL/L (ref 21–32)
CREAT SERPL-MCNC: 0.52 MG/DL (ref 0.6–1.3)
DIAGNOSIS, 93000: NORMAL
ERYTHROCYTE [DISTWIDTH] IN BLOOD BY AUTOMATED COUNT: 16.9 % (ref 11.6–14.5)
GLUCOSE BLD STRIP.AUTO-MCNC: 121 MG/DL (ref 70–110)
GLUCOSE BLD STRIP.AUTO-MCNC: 126 MG/DL (ref 70–110)
GLUCOSE BLD STRIP.AUTO-MCNC: 129 MG/DL (ref 70–110)
GLUCOSE BLD STRIP.AUTO-MCNC: 129 MG/DL (ref 70–110)
GLUCOSE SERPL-MCNC: 118 MG/DL (ref 74–99)
HCT VFR BLD AUTO: 33.3 % (ref 35–45)
HGB BLD-MCNC: 10.5 G/DL (ref 12–16)
MAGNESIUM SERPL-MCNC: 1.9 MG/DL (ref 1.6–2.6)
MCH RBC QN AUTO: 22.1 PG (ref 24–34)
MCHC RBC AUTO-ENTMCNC: 31.5 G/DL (ref 31–37)
MCV RBC AUTO: 70 FL (ref 74–97)
P-R INTERVAL, ECG05: 190 MS
PHOSPHATE SERPL-MCNC: 3.6 MG/DL (ref 2.5–4.9)
PLATELET # BLD AUTO: 236 K/UL (ref 135–420)
PMV BLD AUTO: 10.8 FL (ref 9.2–11.8)
POTASSIUM SERPL-SCNC: 4 MMOL/L (ref 3.5–5.5)
Q-T INTERVAL, ECG07: 434 MS
QRS DURATION, ECG06: 146 MS
QTC CALCULATION (BEZET), ECG08: 471 MS
RBC # BLD AUTO: 4.76 M/UL (ref 4.2–5.3)
SODIUM SERPL-SCNC: 139 MMOL/L (ref 136–145)
VENTRICULAR RATE, ECG03: 71 BPM
WBC # BLD AUTO: 8.6 K/UL (ref 4.6–13.2)

## 2017-08-06 PROCEDURE — 80048 BASIC METABOLIC PNL TOTAL CA: CPT | Performed by: INTERNAL MEDICINE

## 2017-08-06 PROCEDURE — 85027 COMPLETE CBC AUTOMATED: CPT | Performed by: INTERNAL MEDICINE

## 2017-08-06 PROCEDURE — 82784 ASSAY IGA/IGD/IGG/IGM EACH: CPT | Performed by: INTERNAL MEDICINE

## 2017-08-06 PROCEDURE — 36415 COLL VENOUS BLD VENIPUNCTURE: CPT | Performed by: INTERNAL MEDICINE

## 2017-08-06 PROCEDURE — 99218 HC RM OBSERVATION: CPT

## 2017-08-06 PROCEDURE — 94660 CPAP INITIATION&MGMT: CPT

## 2017-08-06 PROCEDURE — 82962 GLUCOSE BLOOD TEST: CPT

## 2017-08-06 PROCEDURE — 96372 THER/PROPH/DIAG INJ SC/IM: CPT

## 2017-08-06 PROCEDURE — 74011250636 HC RX REV CODE- 250/636: Performed by: INTERNAL MEDICINE

## 2017-08-06 PROCEDURE — 97161 PT EVAL LOW COMPLEX 20 MIN: CPT

## 2017-08-06 PROCEDURE — 74011250637 HC RX REV CODE- 250/637: Performed by: INTERNAL MEDICINE

## 2017-08-06 PROCEDURE — 84100 ASSAY OF PHOSPHORUS: CPT | Performed by: INTERNAL MEDICINE

## 2017-08-06 PROCEDURE — 74011000250 HC RX REV CODE- 250: Performed by: INTERNAL MEDICINE

## 2017-08-06 PROCEDURE — 94640 AIRWAY INHALATION TREATMENT: CPT

## 2017-08-06 PROCEDURE — 83735 ASSAY OF MAGNESIUM: CPT | Performed by: INTERNAL MEDICINE

## 2017-08-06 PROCEDURE — 86340 INTRINSIC FACTOR ANTIBODY: CPT | Performed by: INTERNAL MEDICINE

## 2017-08-06 PROCEDURE — 93005 ELECTROCARDIOGRAM TRACING: CPT

## 2017-08-06 RX ORDER — LANOLIN ALCOHOL/MO/W.PET/CERES
1 CREAM (GRAM) TOPICAL 2 TIMES DAILY WITH MEALS
Status: DISCONTINUED | OUTPATIENT
Start: 2017-08-06 | End: 2017-08-08 | Stop reason: HOSPADM

## 2017-08-06 RX ADMIN — SIMVASTATIN 20 MG: 20 TABLET, FILM COATED ORAL at 21:24

## 2017-08-06 RX ADMIN — CLONIDINE HYDROCHLORIDE 0.1 MG: 0.1 TABLET ORAL at 07:21

## 2017-08-06 RX ADMIN — ASPIRIN 81 MG: 81 TABLET, COATED ORAL at 07:20

## 2017-08-06 RX ADMIN — HEPARIN SODIUM 5000 UNITS: 5000 INJECTION, SOLUTION INTRAVENOUS; SUBCUTANEOUS at 17:33

## 2017-08-06 RX ADMIN — TRAMADOL HYDROCHLORIDE 50 MG: 50 TABLET, FILM COATED ORAL at 02:11

## 2017-08-06 RX ADMIN — FERROUS SULFATE TAB 325 MG (65 MG ELEMENTAL FE) 325 MG: 325 (65 FE) TAB at 17:33

## 2017-08-06 RX ADMIN — ALBUTEROL SULFATE 2.5 MG: 2.5 SOLUTION RESPIRATORY (INHALATION) at 07:43

## 2017-08-06 RX ADMIN — TRAMADOL HYDROCHLORIDE 50 MG: 50 TABLET, FILM COATED ORAL at 21:24

## 2017-08-06 RX ADMIN — CYANOCOBALAMIN 1000 MCG: 1000 INJECTION, SOLUTION INTRAMUSCULAR at 09:41

## 2017-08-06 RX ADMIN — HEPARIN SODIUM 5000 UNITS: 5000 INJECTION, SOLUTION INTRAVENOUS; SUBCUTANEOUS at 07:21

## 2017-08-06 RX ADMIN — NIFEDIPINE 30 MG: 30 TABLET, FILM COATED, EXTENDED RELEASE ORAL at 07:21

## 2017-08-06 RX ADMIN — TRAMADOL HYDROCHLORIDE 50 MG: 50 TABLET, FILM COATED ORAL at 15:23

## 2017-08-06 RX ADMIN — POLYETHYLENE GLYCOL 3350 17 G: 17 POWDER, FOR SOLUTION ORAL at 07:21

## 2017-08-06 RX ADMIN — ALBUTEROL SULFATE 2.5 MG: 2.5 SOLUTION RESPIRATORY (INHALATION) at 14:06

## 2017-08-06 RX ADMIN — BUDESONIDE AND FORMOTEROL FUMARATE DIHYDRATE 2 PUFF: 160; 4.5 AEROSOL RESPIRATORY (INHALATION) at 07:43

## 2017-08-06 RX ADMIN — GABAPENTIN 300 MG: 300 CAPSULE ORAL at 21:23

## 2017-08-06 RX ADMIN — HEPARIN SODIUM 5000 UNITS: 5000 INJECTION, SOLUTION INTRAVENOUS; SUBCUTANEOUS at 01:23

## 2017-08-06 RX ADMIN — ALBUTEROL SULFATE 2.5 MG: 2.5 SOLUTION RESPIRATORY (INHALATION) at 19:13

## 2017-08-06 RX ADMIN — CLONIDINE HYDROCHLORIDE 0.1 MG: 0.1 TABLET ORAL at 17:33

## 2017-08-06 RX ADMIN — PANTOPRAZOLE SODIUM 40 MG: 40 TABLET, DELAYED RELEASE ORAL at 07:20

## 2017-08-06 RX ADMIN — GABAPENTIN 300 MG: 300 CAPSULE ORAL at 17:33

## 2017-08-06 RX ADMIN — NYSTATIN: 100000 CREAM TOPICAL at 21:32

## 2017-08-06 RX ADMIN — BUDESONIDE AND FORMOTEROL FUMARATE DIHYDRATE 2 PUFF: 160; 4.5 AEROSOL RESPIRATORY (INHALATION) at 19:13

## 2017-08-06 RX ADMIN — GABAPENTIN 300 MG: 300 CAPSULE ORAL at 07:20

## 2017-08-06 RX ADMIN — NYSTATIN: 100000 CREAM TOPICAL at 07:30

## 2017-08-06 RX ADMIN — NYSTATIN: 100000 CREAM TOPICAL at 01:25

## 2017-08-06 NOTE — PROGRESS NOTES
Hospitalist Progress Note    Patient: Ralph Dee Age: 76 y.o. : 1940 MR#: [id number] SSN: [id number]  Date/Time: 2017 11:01 AM    Subjective:     \"I feel good but I still not there when I get up and walk. \"  No overnight event. Duplex of legs, duplex of carotid, are within normal limit. CTA chest with no PE. No report of syncope or collapse  Telemetry with premature supraventricular complex, no heart block   Lab showed low VitB12, low iron    PT evaluate this AM, concern for safe home discharge due to unsteady gaits and recurrent falls at home. ROS: no fever/chills, no headache, no dizziness, no swallowing problem, no sinus pain, no eye pain, no chest pain,   no palpitation, No shortness of breath when sitting, malaise and tire with ambulation, no urinary complaint, leg pain improved. Objective:   VS:   Visit Vitals    /62 (BP 1 Location: Left arm, BP Patient Position: Sitting)    Pulse 70    Temp 97.7 °F (36.5 °C)    Resp 18    Ht 4' 7\" (1.397 m)    Wt 90.8 kg (200 lb 2.8 oz)    SpO2 97%    BMI 46.53 kg/m2      Tmax/24hrs: Temp (24hrs), Av.8 °F (36.6 °C), Min:97.7 °F (36.5 °C), Max:98.1 °F (36.7 °C)    Intake/Output Summary (Last 24 hours) at 17 1101  Last data filed at 17 0656   Gross per 24 hour   Intake              500 ml   Output             1001 ml   Net             -501 ml     Telemetry with PVC  General:  Cooperative, not in distress, speaks in full sentences  HEENT: PERRL, EOMI, supple large neck, moist oral mucosa  Cardiovascular:  S1S2 regular, no rub/gallop  Pulmonary:  Mild fine crackle at lung base, no wheezing  GI:  Soft, non tender, non distended, +bs  Extremities:  No pedal edema, distal pulses appreciated  Neuro: AOx3, no gross deficit appreciate.  Ambulated with rolling walk, unstable gait due to arthritic pain    Additional:   Current Facility-Administered Medications   Medication Dose Route Frequency    ferrous sulfate tablet 325 mg 1 Tab Oral BID WITH MEALS    aspirin delayed-release tablet 81 mg  81 mg Oral DAILY    cloNIDine HCl (CATAPRES) tablet 0.1 mg  0.1 mg Oral BID    budesonide-formoterol (SYMBICORT) 160-4.5 mcg/actuation HFA inhaler 2 Puff  2 Puff Inhalation BID RT    gabapentin (NEURONTIN) capsule 300 mg  300 mg Oral TID    NIFEdipine ER (PROCARDIA XL) tablet 30 mg  30 mg Oral DAILY    pantoprazole (PROTONIX) tablet 40 mg  40 mg Oral DAILY    simvastatin (ZOCOR) tablet 20 mg  20 mg Oral QHS    traMADol (ULTRAM) tablet 50 mg  50 mg Oral Q6H PRN    heparin (porcine) injection 5,000 Units  5,000 Units SubCUTAneous Q8H    insulin lispro (HUMALOG) injection   SubCUTAneous AC&HS    glucose chewable tablet 16 g  4 Tab Oral PRN    glucagon (GLUCAGEN) injection 1 mg  1 mg IntraMUSCular PRN    dextrose (D50W) injection syrg 12.5-25 g  25-50 mL IntraVENous PRN    albuterol (PROVENTIL VENTOLIN) nebulizer solution 2.5 mg  2.5 mg Nebulization Q6HWA RT    nystatin (MYCOSTATIN) 100,000 unit/gram cream   Topical TID    polyethylene glycol (MIRALAX) packet 17 g  17 g Oral DAILY          Labs:    Recent Results (from the past 24 hour(s))   TROPONIN I    Collection Time: 08/05/17 11:10 AM   Result Value Ref Range    Troponin-I, Qt. <0.02 0.0 - 0.045 NG/ML   CKMB PROFILE    Collection Time: 08/05/17 11:10 AM   Result Value Ref Range     26 - 192 U/L    CK - MB 3.0 <3.6 ng/ml    CK-MB Index 2.2 0.0 - 4.0 %   VITAMIN B12 & FOLATE    Collection Time: 08/05/17 11:10 AM   Result Value Ref Range    Vitamin B12 200 (L) 211 - 911 pg/mL    Folate 17.3 3.10 - 17.50 ng/mL   IRON PROFILE    Collection Time: 08/05/17 11:10 AM   Result Value Ref Range    Iron 46 (L) 50 - 175 ug/dL    TIBC 368 250 - 450 ug/dL    Iron % saturation 13 %   FERRITIN    Collection Time: 08/05/17 11:10 AM   Result Value Ref Range    Ferritin 26 8 - 388 NG/ML   GLUCOSE, POC    Collection Time: 08/05/17 12:26 PM   Result Value Ref Range    Glucose (POC) 122 (H) 70 - 110 mg/dL   TROPONIN I    Collection Time: 08/05/17  4:55 PM   Result Value Ref Range    Troponin-I, Qt. <0.02 0.0 - 0.045 NG/ML   CKMB PROFILE    Collection Time: 08/05/17  4:55 PM   Result Value Ref Range     26 - 192 U/L    CK - MB 2.7 <3.6 ng/ml    CK-MB Index 2.0 0.0 - 4.0 %   GLUCOSE, POC    Collection Time: 08/05/17  5:31 PM   Result Value Ref Range    Glucose (POC) 144 (H) 70 - 110 mg/dL   GLUCOSE, POC    Collection Time: 08/05/17  8:53 PM   Result Value Ref Range    Glucose (POC) 119 (H) 70 - 112 mg/dL   METABOLIC PANEL, BASIC    Collection Time: 08/06/17  4:51 AM   Result Value Ref Range    Sodium 139 136 - 145 mmol/L    Potassium 4.0 3.5 - 5.5 mmol/L    Chloride 103 100 - 108 mmol/L    CO2 28 21 - 32 mmol/L    Anion gap 8 3.0 - 18 mmol/L    Glucose 118 (H) 74 - 99 mg/dL    BUN 10 7.0 - 18 MG/DL    Creatinine 0.52 (L) 0.6 - 1.3 MG/DL    BUN/Creatinine ratio 19 12 - 20      GFR est AA >60 >60 ml/min/1.73m2    GFR est non-AA >60 >60 ml/min/1.73m2    Calcium 9.0 8.5 - 10.1 MG/DL   MAGNESIUM    Collection Time: 08/06/17  4:51 AM   Result Value Ref Range    Magnesium 1.9 1.6 - 2.6 mg/dL   PHOSPHORUS    Collection Time: 08/06/17  4:51 AM   Result Value Ref Range    Phosphorus 3.6 2.5 - 4.9 MG/DL   CBC W/O DIFF    Collection Time: 08/06/17  4:51 AM   Result Value Ref Range    WBC 8.6 4.6 - 13.2 K/uL    RBC 4.76 4.20 - 5.30 M/uL    HGB 10.5 (L) 12.0 - 16.0 g/dL    HCT 33.3 (L) 35.0 - 45.0 %    MCV 70.0 (L) 74.0 - 97.0 FL    MCH 22.1 (L) 24.0 - 34.0 PG    MCHC 31.5 31.0 - 37.0 g/dL    RDW 16.9 (H) 11.6 - 14.5 %    PLATELET 490 069 - 761 K/uL    MPV 10.8 9.2 - 11.8 FL   GLUCOSE, POC    Collection Time: 08/06/17  6:38 AM   Result Value Ref Range    Glucose (POC) 126 (H) 70 - 110 mg/dL     Duplex Legs:  INTERPRETATION/FINDINGS  Duplex images were obtained using 2-D gray scale, color flow, and  spectral Doppler analysis. Right leg :  1.  Deep vein(s) visualized include the common femoral, proximal  femoral, mid femoral, distal femoral, popliteal(above knee),  popliteal(fossa), popliteal(below knee), posterior tibial and peroneal   veins. 2. No evidence of deep venous thrombosis detected in the veins  visualized. 3. Superficial vein(s) visualized include the great saphenous vein. 4. No evidence of superficial thrombosis detected. Left leg :  1. Deep vein(s) visualized include the common femoral, proximal  femoral, mid femoral, distal femoral, popliteal(above knee),  popliteal(fossa), popliteal(below knee), posterior tibial and peroneal   veins. 2. No evidence of deep venous thrombosis detected in the veins  visualized. 3. Superficial vein(s) visualized include the great saphenous vein. 4. No evidence of superficial thrombosis detected. CTA chest:  FINDINGS:  The lungs are clear. Pulmonary vascular opacification is satisfactory. There are no pulmonary emboli. There is no aneurysm or dissection of the  thoracic aorta. .  No pleural pathology is evident. There is no mediastinal adenopathy or mass  Sections through the upper abdomen reveal no acute findings. Cholelithiasis  present  IMPRESSION:  Negative for pulmonary emboli. Findings indicating a definite cause for dyspnea  on exertion are not identified. Duplex carotid:   INTERPRETATION/FINDINGS  Duplex images were obtained using 2-D gray scale, color flow, and  spectral Doppler analysis. 1. Bilateral <50% stenosis of the internal carotid arteries. 2. No significant stenosis in the external carotid arteries  bilaterally. 3. Antegrade flow in both vertebral arteries. 4. Normal flow in both subclavian arteries. Plaque Morphology:   1. Hyperechoic plaque in the bulb and right ICA. 2. Hyperechoic plaque in the bulb and left ICA. Assessment/Plan:     1.   Syncope with collapse, resolved     - telemetry with no bradycardia or arrhythmia, no orthostatic bp     - normal Duplex carotid, previous Echo with no valvular pathology     - d-dimer was positive but Negative for PE or DVT on studies     - will need outpt follow up with [facility] cardiology for event monitoring     2. Left leg pain and swelling, improved       - negative duplex for DVT, normal proBNP      - suspected related to diabetic neuropathy, on gabapentin       - PT eval, recommended for SNF    3.  HTN, stable on home dose of nifedipine, clonidine, can resume lasix tomorrow     4. Chronic diastolic heart failure, proBNP is normal      - hold lasix today, will resume tomorrow     5. Hyperlipidemia, on statin    6. Microcytic anemia, iron deficiency anemia with Vitamin B12 deficiency      - on oral iron replacement, follow up on stool occult blood     - check intrinsic factor Ab, and celiac disease panel      - give IM vitamin B12 today    7. Type 2 DM, on ISS, hold home Metformin  8. ALEXA, on CPAP, tolerated well  9. COPD/Asthma, stable on bronchodilators, Advair/Symbicort   10. Left forehead laceration from fall, stable with resolved pain    11. Physical deconditioning, PT recommended to SNF, patient agreed.        - will speak with CM for placement planning    Disposition: to SNF, pending authorization       Additional Notes:    Time spent 37 minutes      Case discussed with:  [x]Patient  []Family  [x]Nursing  [x]Case Management  DVT Prophylaxis:  []Lovenox  [x]Hep SQ  []SCDs  []Coumadin   []On Heparin gtt    Signed By: Erick Jackson MD     August 6, 2017 11:01 AM

## 2017-08-06 NOTE — PROGRESS NOTES
Care Management Interventions  PCP Verified by CM: Yes  Physical Therapy Consult: Yes  Occupational Therapy Consult: Yes  Current Support Network: Lives Alone  Plan discussed with Pt/Family/Caregiver: Yes  Freedom of Choice Offered: Yes (For SNF)  Discharge Location  Discharge Placement: Skilled nursing facility     Pt is a 76year old female admitted for syncope. Pt is alert and oriented in room. Pt states that she resides alone and she is willing to transfer to SNF for short term rehab. FOC presented and pt chooses [facility] and [facility]. Awaiting OT eval to submit for auth from [email or web address]. Pt indicates being semi independent with ADLs and reports that she's been using a rollator. Pt requesting for a new rollator prior to her discharge home. She mentions that the rollator she currently have is 9years old. Physician is aware. Pt shares that she will need medical transport at discharge.

## 2017-08-06 NOTE — PROGRESS NOTES
Problem: Mobility Impaired (Adult and Pediatric)  Goal: *Acute Goals and Plan of Care (Insert Text)  Physical Therapy Goals  Initiated 8/6/2017 and to be accomplished within 7 day(s)  1. Patient will move from supine to sit and sit to supine , scoot up and down and roll side to side in bed with modified independence. 2. Patient will transfer from bed to chair and chair to bed with supervision/set-up using the least restrictive device. 3. Patient will perform sit to stand with supervision/set-up. 4. Patient will ambulate with supervision/set-up for >50 feet with the least restrictive device. 5. Patient will ascend/descend 2 stairs with 1 handrail(s) with supervision/set-up. PHYSICAL THERAPY EVALUATION     Patient: Avelino Ch (1 y.o. female)  Date: 8/6/2017  Primary Diagnosis: Syncope        Precautions: Fall         ASSESSMENT :  Pt is 67yo F admitted to hospital for syncope and has hx of frequent falls. Pt presents today alert and agreeable to therapy, sitting in locked recliner. Pt sat for objective assessment then stood form low seat height with Megha and additional time; required VC's to active glutes and quads for improve upright posture and stability. Pt used RW to ambulated total 25ft with decreased safety awareness and fatigue with short distance ambulation. Pt required safety awareness training on standing rest breaks and walker use before returning to locked recliner. Pt was left resting with call bell by her side and denied need for further assistance. Pt instructed to have staff member with her if she needed to get up for any reason. Pt currently demonstrates decreased strength, mobility, and endurance and will benefit from skilled intervention to address the above impairments.   Patients rehabilitation potential is considered to be Good  Factors which may influence rehabilitation potential include:   [ ]         None noted  [ ]         Mental ability/status  [X]         Medical condition  [X]         Home/family situation and support systems  [X]         Safety awareness  [X]         Pain tolerance/management  [ ]         Other:        PLAN :  Recommendations and Planned Interventions:  [X]           Bed Mobility Training             [X]    Neuromuscular Re-Education  [X]           Transfer Training                   [ ]    Orthotic/Prosthetic Training  [X]           Gait Training                          [ ]    Modalities  [X]           Therapeutic Exercises          [ ]    Edema Management/Control  [X]           Therapeutic Activities            [X]    Patient and Family Training/Education  [ ]           Other (comment):     Frequency/Duration: Patient will be followed by physical therapy 1-2 times per day/4-7 days per week to address goals. Discharge Recommendations: Otto Gleason  Further Equipment Recommendations for Discharge: N/A       G-CODES      Mobility  Current  CJ= 20-39%   Goal  CI= 1-19%. The severity rating is based on the Level of Assistance required for Functional Mobility and ADLs.            G-CODES      Eval Complexity: History: MEDIUM  Complexity : 1-2 comorbidities / personal factors will impact the outcome/ POC Exam:LOW Complexity : 1-2 Standardized tests and measures addressing body structure, function, activity limitation and / or participation in recreation  Presentation: LOW Complexity : Stable, uncomplicated  Clinical Decision Making:Low Complexity   Overall Complexity:LOW       SUBJECTIVE:   Patient stated \"I feel weak in my legs.       OBJECTIVE DATA SUMMARY:       Past Medical History:   Diagnosis Date    Asthma      COPD      Diabetes mellitus ([facility]      Diastolic heart failure (HCC)      Dyslipidemia associated with type 2 diabetes mellitus ([facility]      History of stomach ulcers      Hypertension      Hypothyroidism      Low back pain      Morbid obesity ([facility]      ALEXA (obstructive sleep apnea)      Personal history of tobacco use, presenting hazards to health      Sleep disturbance, unspecified      Type 2 diabetes mellitus with diabetic polyneuropathy (HCC)      Venous insufficiency (chronic) (peripheral)     History reviewed. No pertinent surgical history. Prior Level of Function/Home Situation: Pt lives in 1 story home with 2STE and resides there alone. Pt reports she was independent with I/ADL's and was using RW for mobility. Pt reports she had one session with [facility] therapy before she came into the hospital and has history of frequent falls with 2 in the past few days. Home Situation  Home Environment: Private residence  One/Two Story Residence: One story  Living Alone: Yes  Support Systems: Child(vidhya)  Patient Expects to be Discharged to[de-identified] Private residence  Current DME Used/Available at Home: Walker, rolling  Critical Behavior:    A&OX4  Strength:    Strength: Generally decreased, functional (BLE)   Tone & Sensation:   Tone: Normal (BLE)   Sensation: Intact (BLE to LT)   Range Of Motion:  AROM: Within functional limits (ble)   Functional Mobility:  Bed Mobility:   Scooting: Contact guard assistance  Transfers:  Sit to Stand: Minimum assistance (from low seat surface)  Stand to Sit: Contact guard assistance       Balance:   Sitting: Intact  Standing: Impaired; With support  Standing - Static: Fair  Standing - Dynamic : Fair  Ambulation/Gait Training:  Distance (ft): 25 Feet (ft)  Assistive Device: Walker, rolling  Ambulation - Level of Assistance: Minimal assistance   Gait Abnormalities: Decreased step clearance;Trunk sway increased   Base of Support: Widened   Speed/Elsa: Pace decreased (<100 feet/min); Slow   Interventions: Safety awareness training;Verbal cues; Visual/Demos     Pain:  Pt reports 0/10 pain or discomfort prior to treatment. Pt reports 0/10 pain or discomfort post treatment. Activity Tolerance:   Pt demonstrated fatigue and SOB with short distance ambulation.  Pt also demonstrated decreased safety awareness. Please refer to the flowsheet for vital signs taken during this treatment. After treatment:   [X]         Patient left in no apparent distress sitting up in chair  [ ]         Patient left in no apparent distress in bed  [X]         Call bell left within reach  [ ]         Nursing notified  [ ]         Caregiver present  [ ]         Bed alarm activated      COMMUNICATION/EDUCATION:   [X]         Fall prevention education was provided and the patient/caregiver indicated understanding. [X]         Patient/family have participated as able in goal setting and plan of care. [X]         Patient/family agree to work toward stated goals and plan of care. [ ]         Patient understands intent and goals of therapy, but is neutral about his/her participation. [ ]         Patient is unable to participate in goal setting and plan of care.      Thank you for this referral.  Dany Emery, PT   Time Calculation: 14 mins

## 2017-08-07 LAB
GLUCOSE BLD STRIP.AUTO-MCNC: 109 MG/DL (ref 70–110)
GLUCOSE BLD STRIP.AUTO-MCNC: 111 MG/DL (ref 70–110)
GLUCOSE BLD STRIP.AUTO-MCNC: 113 MG/DL (ref 70–110)
GLUCOSE BLD STRIP.AUTO-MCNC: 153 MG/DL (ref 70–110)
HBA1C MFR BLD: 6.4 % (ref 4.2–5.6)

## 2017-08-07 PROCEDURE — 97116 GAIT TRAINING THERAPY: CPT

## 2017-08-07 PROCEDURE — 94640 AIRWAY INHALATION TREATMENT: CPT

## 2017-08-07 PROCEDURE — 82962 GLUCOSE BLOOD TEST: CPT

## 2017-08-07 PROCEDURE — 74011000250 HC RX REV CODE- 250: Performed by: INTERNAL MEDICINE

## 2017-08-07 PROCEDURE — 36415 COLL VENOUS BLD VENIPUNCTURE: CPT | Performed by: INTERNAL MEDICINE

## 2017-08-07 PROCEDURE — 96372 THER/PROPH/DIAG INJ SC/IM: CPT

## 2017-08-07 PROCEDURE — 97530 THERAPEUTIC ACTIVITIES: CPT

## 2017-08-07 PROCEDURE — 74011250637 HC RX REV CODE- 250/637: Performed by: INTERNAL MEDICINE

## 2017-08-07 PROCEDURE — 99218 HC RM OBSERVATION: CPT

## 2017-08-07 PROCEDURE — 74011250636 HC RX REV CODE- 250/636: Performed by: INTERNAL MEDICINE

## 2017-08-07 PROCEDURE — 97165 OT EVAL LOW COMPLEX 30 MIN: CPT

## 2017-08-07 PROCEDURE — 83036 HEMOGLOBIN GLYCOSYLATED A1C: CPT | Performed by: INTERNAL MEDICINE

## 2017-08-07 PROCEDURE — 74011636637 HC RX REV CODE- 636/637: Performed by: INTERNAL MEDICINE

## 2017-08-07 RX ORDER — POLYETHYLENE GLYCOL 3350 17 G/17G
17 POWDER, FOR SOLUTION ORAL DAILY
Qty: 30 PACKET | Refills: 0 | Status: SHIPPED
Start: 2017-08-07

## 2017-08-07 RX ORDER — NYSTATIN 100000 U/G
1 CREAM TOPICAL 3 TIMES DAILY
Qty: 30 G | Refills: 0 | Status: SHIPPED
Start: 2017-08-07

## 2017-08-07 RX ORDER — HYDRALAZINE HYDROCHLORIDE 20 MG/ML
10 INJECTION INTRAMUSCULAR; INTRAVENOUS
Status: DISCONTINUED | OUTPATIENT
Start: 2017-08-07 | End: 2017-08-08 | Stop reason: HOSPADM

## 2017-08-07 RX ORDER — ALBUTEROL SULFATE 0.83 MG/ML
2.5 SOLUTION RESPIRATORY (INHALATION) EVERY 6 HOURS
Qty: 24 EACH | Refills: 0 | Status: SHIPPED
Start: 2017-08-07

## 2017-08-07 RX ORDER — LANOLIN ALCOHOL/MO/W.PET/CERES
325 CREAM (GRAM) TOPICAL 2 TIMES DAILY WITH MEALS
Qty: 60 TAB | Refills: 0 | Status: SHIPPED
Start: 2017-08-07

## 2017-08-07 RX ORDER — FUROSEMIDE 20 MG/1
20 TABLET ORAL DAILY
Status: DISCONTINUED | OUTPATIENT
Start: 2017-08-07 | End: 2017-08-08 | Stop reason: HOSPADM

## 2017-08-07 RX ORDER — CYANOCOBALAMIN 1000 UG/ML
1000 INJECTION, SOLUTION INTRAMUSCULAR; SUBCUTANEOUS
Qty: 1 VIAL | Refills: 0 | Status: SHIPPED
Start: 2017-08-11

## 2017-08-07 RX ADMIN — NYSTATIN: 100000 CREAM TOPICAL at 07:32

## 2017-08-07 RX ADMIN — GABAPENTIN 300 MG: 300 CAPSULE ORAL at 21:45

## 2017-08-07 RX ADMIN — CLONIDINE HYDROCHLORIDE 0.1 MG: 0.1 TABLET ORAL at 17:05

## 2017-08-07 RX ADMIN — PANTOPRAZOLE SODIUM 40 MG: 40 TABLET, DELAYED RELEASE ORAL at 07:31

## 2017-08-07 RX ADMIN — SIMVASTATIN 20 MG: 20 TABLET, FILM COATED ORAL at 21:45

## 2017-08-07 RX ADMIN — ALBUTEROL SULFATE 2.5 MG: 2.5 SOLUTION RESPIRATORY (INHALATION) at 19:48

## 2017-08-07 RX ADMIN — GABAPENTIN 300 MG: 300 CAPSULE ORAL at 07:31

## 2017-08-07 RX ADMIN — HEPARIN SODIUM 5000 UNITS: 5000 INJECTION, SOLUTION INTRAVENOUS; SUBCUTANEOUS at 17:05

## 2017-08-07 RX ADMIN — NYSTATIN: 100000 CREAM TOPICAL at 22:00

## 2017-08-07 RX ADMIN — GABAPENTIN 300 MG: 300 CAPSULE ORAL at 17:05

## 2017-08-07 RX ADMIN — BUDESONIDE AND FORMOTEROL FUMARATE DIHYDRATE 2 PUFF: 160; 4.5 AEROSOL RESPIRATORY (INHALATION) at 19:48

## 2017-08-07 RX ADMIN — HEPARIN SODIUM 5000 UNITS: 5000 INJECTION, SOLUTION INTRAVENOUS; SUBCUTANEOUS at 07:31

## 2017-08-07 RX ADMIN — NIFEDIPINE 30 MG: 30 TABLET, FILM COATED, EXTENDED RELEASE ORAL at 07:32

## 2017-08-07 RX ADMIN — BUDESONIDE AND FORMOTEROL FUMARATE DIHYDRATE 2 PUFF: 160; 4.5 AEROSOL RESPIRATORY (INHALATION) at 09:18

## 2017-08-07 RX ADMIN — ASPIRIN 81 MG: 81 TABLET, COATED ORAL at 07:32

## 2017-08-07 RX ADMIN — HEPARIN SODIUM 5000 UNITS: 5000 INJECTION, SOLUTION INTRAVENOUS; SUBCUTANEOUS at 00:34

## 2017-08-07 RX ADMIN — TRAMADOL HYDROCHLORIDE 50 MG: 50 TABLET, FILM COATED ORAL at 19:35

## 2017-08-07 RX ADMIN — ALBUTEROL SULFATE 2.5 MG: 2.5 SOLUTION RESPIRATORY (INHALATION) at 09:18

## 2017-08-07 RX ADMIN — CLONIDINE HYDROCHLORIDE 0.1 MG: 0.1 TABLET ORAL at 07:32

## 2017-08-07 RX ADMIN — FERROUS SULFATE TAB 325 MG (65 MG ELEMENTAL FE) 325 MG: 325 (65 FE) TAB at 17:05

## 2017-08-07 RX ADMIN — TRAMADOL HYDROCHLORIDE 50 MG: 50 TABLET, FILM COATED ORAL at 07:31

## 2017-08-07 RX ADMIN — POLYETHYLENE GLYCOL 3350 17 G: 17 POWDER, FOR SOLUTION ORAL at 07:32

## 2017-08-07 RX ADMIN — FERROUS SULFATE TAB 325 MG (65 MG ELEMENTAL FE) 325 MG: 325 (65 FE) TAB at 07:31

## 2017-08-07 RX ADMIN — INSULIN LISPRO 2 UNITS: 100 INJECTION, SOLUTION INTRAVENOUS; SUBCUTANEOUS at 21:45

## 2017-08-07 RX ADMIN — FUROSEMIDE 20 MG: 20 TABLET ORAL at 11:15

## 2017-08-07 NOTE — DIABETES MGMT
Diabetes Patient/Family Education Record    Factors That  May Influence Patients Ability  to Learn or  Comply With  Recommendations:    []   Language barrier    []   Cultural needs   []   Motivation    []   Cognitive limitation    []   Physical   []   Education    []   Physiological factors   []   Hearing/vision/speaking impairment   []   Worship beliefs    []   Financial factors   []  Other:   [x]  No factors identified at this time.      Person Instructed:   [x]   Patient   []   Family   []  Other     Preference for Learning:   [x]   Verbal   [x]   Written   []  Demonstration     Level of Comprehension & Competence:    []  Good                                      [x] Fair                                     []  Poor                             []  Needs Reinforcement   [x]  Teachback completed    Education Component:   [x]  Medication management,   [x]  Nutritional management including the role of carbohydrate intake   []  Exercise   []  Signs, symptoms, and treatment of hyperglycemia and hypoglycemia   [] Treatment of hyperglycemia and hypoglycemia   []  Importance of blood glucose monitoring and how to obtain a blood glucose meter    []  Instruction on use of blood glucose meter   [x]  Discuss the importance of HbA1C monitoring and provide patient with  results   []  Sick day guidelines   []  Proper use and disposal of lancets, needles, syringes or insulin pens (if appropriate)   []  Potential long-term complications (retinopathy, kidney disease, neuropathy, heart disease, stroke, vascular disease, foot care)   [x] Provide emergency contact number and contact number for more information    [x]  Goal:  Patient/family will demonstrate understanding of Diabetes Self Management Skills by: (date) _8/10/17______  Plan for post-discharge education or self-management support:    [] Outpatient class schedule provided            [x] Patient Declined    [] Scheduled for outpatient classes (date) _______     Consuelo Matute MS, RD, CDE  Pager: [phone]

## 2017-08-07 NOTE — DISCHARGE INSTRUCTIONS
Fainting: Care Instructions  Your Care Instructions    When you faint, or pass out, you lose consciousness for a short time. A brief drop in blood flow to the brain often causes it. When you fall or lie down, more blood flows to your brain and you regain consciousness. Emotional stress, pain, or overheating--especially if you have been standing--can make you faint. In these cases, fainting is usually not serious. But fainting can be a sign of a more serious problem. Your doctor may want you to have more tests to rule out other causes. The treatment you need depends on the reason why you fainted. The doctor has checked you carefully, but problems can develop later. If you notice any problems or new symptoms, get medical treatment right away. Follow-up care is a key part of your treatment and safety. Be sure to make and go to all appointments, and call your doctor if you are having problems. It's also a good idea to know your test results and keep a list of the medicines you take. How can you care for yourself at home? · Drink plenty of fluids to prevent dehydration. If you have kidney, heart, or liver disease and have to limit fluids, talk with your doctor before you increase your fluid intake. When should you call for help? Call 911 anytime you think you may need emergency care. For example, call if:  · You have symptoms of a heart problem. These may include:  ¨ Chest pain or pressure. ¨ Severe trouble breathing. ¨ A fast or irregular heartbeat. ¨ Lightheadedness or sudden weakness. ¨ Coughing up pink, foamy mucus. ¨ Passing out. After you call 911, the  may tell you to chew 1 adult-strength or 2 to 4 low-dose aspirin. Wait for an ambulance. Do not try to drive yourself. · You have symptoms of a stroke. These may include:  ¨ Sudden numbness, tingling, weakness, or loss of movement in your face, arm, or leg, especially on only one side of your body. ¨ Sudden vision changes.   ¨ Sudden trouble speaking. ¨ Sudden confusion or trouble understanding simple statements. ¨ Sudden problems with walking or balance. ¨ A sudden, severe headache that is different from past headaches. · You passed out (lost consciousness) again. Watch closely for changes in your health, and be sure to contact your doctor if:  · You do not get better as expected. Where can you learn more? Go to http://timbo-tiesha.info/. Enter O449 in the search box to learn more about \"Fainting: Care Instructions. \"  Current as of: 2017  Content Version: 11.3  © 0311-1439 [employer]. Care instructions adapted under license by [employer] (which disclaims liability or warranty for this information). If you have questions about a medical condition or this instruction, always ask your healthcare professional. [address] any warranty or liability for your use of this information. Patient armband removed and given to patient to take home. Patient was informed of the privacy risks if armband lost or stolen    Aviate Activation    Thank you for requesting access to Aviate. Please follow the instructions below to securely access and download your online medical record. Aviate allows you to send messages to your doctor, view your test results, renew your prescriptions, schedule appointments, and more. How Do I Sign Up? 1. In your internet browser, go to [email or web address]  2. Click on the First Time User? Click Here link in the Sign In box. You will be redirect to the New Member Sign Up page. 3. Enter your Aviate Access Code exactly as it appears below. You will not need to use this code after youve completed the sign-up process. If you do not sign up before the expiration date, you must request a new code. Aviate Access Code: [email or web address]  Expires: 2017  9:57 AM (This is the date your Aviate access code will )    4.  Enter the last four digits of your Social Security Number (xxxx) and Date of Birth (mm/dd/yyyy) as indicated and click Submit. You will be taken to the next sign-up page. 5. Create a [employer] ID. This will be your [employer] login ID and cannot be changed, so think of one that is secure and easy to remember. 6. Create a [employer] password. You can change your password at any time. 7. Enter your Password Reset Question and Answer. This can be used at a later time if you forget your password. 8. Enter your e-mail address. You will receive e-mail notification when new information is available in [address] 9. Click Sign Up. You can now view and download portions of your medical record. 10. Click the Download Summary menu link to download a portable copy of your medical information. Additional Information    If you have questions, please visit the Frequently Asked Questions section of the [employer] website at [email or web address] Remember, [employer] is NOT to be used for urgent needs. For medical emergencies, dial 911. DISCHARGE SUMMARY from Nurse    The following personal items are in your possession at time of discharge:    Dental Appliances: Partials  Visual Aid: Glasses        Jewelry: None  Clothing: Pants, At bedside, Footwear, Shirt, Socks, Undergarments, With patient  Other Valuables: With patient, Purse, Eyeglasses             PATIENT INSTRUCTIONS:    After general anesthesia or intravenous sedation, for 24 hours or while taking prescription Narcotics:  · Limit your activities  · Do not drive and operate hazardous machinery  · Do not make important personal or business decisions  · Do  not drink alcoholic beverages  · If you have not urinated within 8 hours after discharge, please contact your surgeon on call.     Report the following to your surgeon:  · Excessive pain, swelling, redness or odor of or around the surgical area  · Temperature over 100.5  · Nausea and vomiting lasting longer than 4 hours or if unable to take medications  · Any signs of decreased circulation or nerve impairment to extremity: change in color, persistent  numbness, tingling, coldness or increase pain  · Any questions        What to do at Home:  Recommended activity: Activity as tolerated    If you experience any of the following symptoms Nausea, vomiting, diarrhea, fever greater than 100.5, dizziness, severe headache, shortness of breath, chest pain, increased pain, please follow up with PCP. *  Please give a list of your current medications to your Primary Care Provider. *  Please update this list whenever your medications are discontinued, doses are      changed, or new medications (including over-the-counter products) are added. *  Please carry medication information at all times in case of emergency situations. These are general instructions for a healthy lifestyle:    No smoking/ No tobacco products/ Avoid exposure to second hand smoke    Surgeon General's Warning:  Quitting smoking now greatly reduces serious risk to your health. Obesity, smoking, and sedentary lifestyle greatly increases your risk for illness    A healthy diet, regular physical exercise & weight monitoring are important for maintaining a healthy lifestyle    You may be retaining fluid if you have a history of heart failure or if you experience any of the following symptoms:  Weight gain of 3 pounds or more overnight or 5 pounds in a week, increased swelling in our hands or feet or shortness of breath while lying flat in bed. Please call your doctor as soon as you notice any of these symptoms; do not wait until your next office visit. Recognize signs and symptoms of STROKE:    F-face looks uneven    A-arms unable to move or move unevenly    S-speech slurred or non-existent    T-time-call 911 as soon as signs and symptoms begin-DO NOT go       Back to bed or wait to see if you get better-TIME IS BRAIN.     Warning Signs of HEART ATTACK     Call 911 if you have these symptoms:   Chest discomfort. Most heart attacks involve discomfort in the center of the chest that lasts more than a few minutes, or that goes away and comes back. It can feel like uncomfortable pressure, squeezing, fullness, or pain.  Discomfort in other areas of the upper body. Symptoms can include pain or discomfort in one or both arms, the back, neck, jaw, or stomach.  Shortness of breath with or without chest discomfort.  Other signs may include breaking out in a cold sweat, nausea, or lightheadedness. Don't wait more than five minutes to call 911 - MINUTES MATTER! Fast action can save your life. Calling 911 is almost always the fastest way to get lifesaving treatment. Emergency Medical Services staff can begin treatment when they arrive -- up to an hour sooner than if someone gets to the hospital by car. The discharge information has been reviewed with the patient and caregiver. The patient and caregiver verbalized understanding. Discharge medications reviewed with the patient and caregiver and appropriate educational materials and side effects teaching were provided.

## 2017-08-07 NOTE — DISCHARGE SUMMARY
Discharge Summary    Patient: Alex Fabian               Sex: female          DOA: 8/5/2017         YOB: 1940      Age:  76 y.o.        LOS:  LOS: 0 days                Admit Date: 8/5/2017    Discharge Date: 8/7/2017    Primary care physician: Drew Castillo MD    Discharge Diagnoses:    1)  Syncope with collapse, resolved   2)  Left leg pain and swelling, improved   3)  Iron deficiency anemia  4)  Vitamin B12 deficiency  5)  Left forehead laceration due to fall  6)  Physical deconditioning     Secondary Diagnoses:  1. COPD/Asthma  2. Type 2 Diabetes Mellitus  3. Chronic diastolic heart failure  4. Essential hypertension  5. Hypothyroidism   6. Morbid obesity   7. Obstructive sleep Apnea  8. Diabetic polyneuropathy   9. Dyslipidemia    Discharge Condition: Good  [facility]  CODE STATUS: Partial Code/DNI    Unfinished Business for SNF/JENCARE Physician:  1)  Please address her ADVANCED DIRECTIVE   2)  She needs to follow up with [facility] [place] Cardiologist for Event monitoring  3)  She will need new order for rolling walker with seat   4)  NOTE: pending labs workup for her Vitamin B12 deficiency, she is currently on IM VitB12  5)  She will need to have her home CPAP with her at [facility] Course: In brief, she sustained recurrent falls while at home. Prior to the morning of her presentation, she sustained a fall and \"just blacked out. \"   She hit her forehead during the fall. She did not has associated dizziness or lightheadedness, no chest palpitation. In the ER, CT head   was negative for intracranial process. No arrhythmia on EKG, negative cardiac enzyme. She was placed on OBS for workup of syncope with collapse. Her recent Echo in out patient was noted to be normal.  Her Duplex carotid   was normal.  Since had positive d-dimer and associated left leg pain, swelling, hence Duplex lower extremities without DVT. CTA chest   with no PE.   Her Telemetry monitor over the last 2 days with no arrhythmia, multiple PVC. She participated with PT who noticed that she was quite deconditioned and recommended PT at [facility]. She was also found to have microcytic anemia. Her iron level is low, her vitamin B12 is low. Intrinsic factor Ab and Celiac panel was sent. She was started on Vitamin B12 IM weekly, oral iron has beep replaced. She needs follow up with cardiology in [facility] for Event monitoring. Last 24 Hours:  \"I'm doing good. I just want to go wherever I can get treatment and get better. \"  Patient has decided for [facility]. No overnight event. Tolerated her home medications. Telemetry with no arrhythmia, multiple PVCs noted     ROS: no fever/chills, no headache, no dizziness, no swallowing problem, no sinus pain, no eye pain, no chest pain,   no palpitation, No shortness of breath when sitting, malaise and tire with ambulation, no urinary complaint, leg pain improved. VS:   Visit Vitals    /73    Pulse 95    Temp 97.3 °F (36.3 °C)    Resp 19    Ht 4' 7\" (1.397 m)    Wt 93 kg (205 lb)    SpO2 94%    BMI 47.65 kg/m2      Tmax/24hrs: Temp (24hrs), Av.7 °F (36.5 °C), Min:97.3 °F (36.3 °C), Max:97.9 °F (36.6 °C)    Intake/Output Summary (Last 24 hours) at 17 0940  Last data filed at 17 0400   Gross per 24 hour   Intake              120 ml   Output             1950 ml   Net            -1830 ml       Telemetry with PVC  General:  Cooperative, not in distress, speaks in full sentences  HEENT: PERRL, EOMI, supple large neck, moist oral mucosa  Cardiovascular:  S1S2 regular, no rub/gallop  Pulmonary:  Mild fine crackle at lung base, no wheezing  GI:  Soft, non tender, non distended, +bs  Extremities:  No pedal edema, distal pulses appreciated  Neuro: AOx3, no gross deficit appreciate.  Ambulated with rolling walk, unstable gait due to arthritic pain    Consults: none    Significant Diagnostic Studies: Duplex Legs:  INTERPRETATION/FINDINGS  Duplex images were obtained using 2-D gray scale, color flow, and  spectral Doppler analysis. Right leg :  1. Deep vein(s) visualized include the common femoral, proximal  femoral, mid femoral, distal femoral, popliteal(above knee),  popliteal(fossa), popliteal(below knee), posterior tibial and peroneal   veins. 2. No evidence of deep venous thrombosis detected in the veins  visualized. 3. Superficial vein(s) visualized include the great saphenous vein. 4. No evidence of superficial thrombosis detected. Left leg :  1. Deep vein(s) visualized include the common femoral, proximal  femoral, mid femoral, distal femoral, popliteal(above knee),  popliteal(fossa), popliteal(below knee), posterior tibial and peroneal   veins. 2. No evidence of deep venous thrombosis detected in the veins  visualized. 3. Superficial vein(s) visualized include the great saphenous vein. 4. No evidence of superficial thrombosis detected.        CTA chest:  FINDINGS:  The lungs are clear. Pulmonary vascular opacification is satisfactory. There are no pulmonary emboli. There is no aneurysm or dissection of the  thoracic aorta. .  No pleural pathology is evident. There is no mediastinal adenopathy or mass  Sections through the upper abdomen reveal no acute findings. Cholelithiasis  present  IMPRESSION:  Negative for pulmonary emboli.  Findings indicating a definite cause for dyspnea  on exertion are not identified.     Duplex carotid:   INTERPRETATION/FINDINGS  Duplex images were obtained using 2-D gray scale, color flow, and  spectral Doppler analysis. 1. Bilateral <50% stenosis of the internal carotid arteries. 2. No significant stenosis in the external carotid arteries  bilaterally. 3. Antegrade flow in both vertebral arteries. 4. Normal flow in both subclavian arteries. Plaque Morphology:   1. Hyperechoic plaque in the bulb and right ICA.   2. Hyperechoic plaque in the bulb and left ICA.    CT head:  IMPRESSION:     1. No clearly acute findings. Of note, MRI is more sensitive in the detection of  acute infarct.     2. Mild periventricular white matter low-attenuation, likely chronic  microvascular ischemic change. Suspected small remote lacunar infarcts in the  basal ganglia region bilaterally. Lab/Data Review:  Labs: Results:       Chemistry Recent Labs      08/06/17 0451 08/05/17   0610   GLU  118*  106*   NA  139  136   K  4.0  4.3   CL  103  100   CO2  28  29   BUN  10  23*   CREA  0.52*  0.78   CA  9.0  9.1   AGAP  8  7   BUCR  19  29*   AP   --   65   TP   --   7.3   ALB   --   3.6   GLOB   --   3.7   AGRAT   --   1.0      CBC w/Diff Recent Labs      08/06/17   0451 08/05/17   0610   WBC  8.6  8.6   RBC  4.76  4.86   HGB  10.5*  10.7*   HCT  33.3*  33.9*   PLT  236  240   GRANS   --   52   LYMPH   --   35   EOS   --   3      Coagulation Recent Labs      08/05/17   0610   PTP  13.3   INR  1.0   APTT  35.4       Iron/Ferritin Recent Labs      08/05/17   1110   IRON  46*   Ferritin 26  Vitamin B12: 200  Folate:  17.3     BNP Lab Results   Component Value Date/Time    NT pro-BNP 91 08/05/2017 06:10 AM    NT pro- 05/21/2016 06:30 AM        Cardiac Enzymes Recent Labs      08/05/17   1655  08/05/17   1110   CPK  136  137   CKND1  2.0  2.2      Liver Enzymes Recent Labs      08/05/17   0610   TP  7.3   ALB  3.6   AP  65   SGOT  14*      Thyroid Studies Recent Labs      08/05/17   0610   TSH  1.19          All Micro Results     None                Medications at discharge  including reasons for change and indications for new ones:   Current Discharge Medication List      START taking these medications    Details   ferrous sulfate 325 mg (65 mg iron) tablet Take 1 Tab by mouth two (2) times daily (with meals). Indications: IRON DEFICIENCY ANEMIA  Qty: 60 Tab, Refills: 0      polyethylene glycol (MIRALAX) 17 gram packet Take 1 Packet by mouth daily.  Indications: constipation  Qty: 30 Packet, Refills: 0      cyanocobalamin (VITAMIN B-12) 1,000 mcg/mL injection 1 mL by IntraMUSCular route every seven (7) days. Indications: VITAMIN B12 DEFICIENCY  Qty: 1 Vial, Refills: 0         CONTINUE these medications which have CHANGED    Details   albuterol (PROVENTIL VENTOLIN) 2.5 mg /3 mL (0.083 %) nebulizer solution 3 mL by Nebulization route every six (6) hours. Indications: BRONCHOSPASM PREVENTION  Qty: 24 Each, Refills: 0      nystatin (MYCOSTATIN) topical cream Apply 1 g to affected area three (3) times daily. Apply under her breasts  Qty: 30 g, Refills: 0         CONTINUE these medications which have NOT CHANGED    Details   traMADol (ULTRAM) 50 mg tablet Take 50 mg by mouth every six (6) hours as needed for Pain. cpap machine kit by Does Not Apply route. Change to Auto CPAP 5-20 and please provide new mask. Lots of leak on current Mask. Qty: 1 Kit, Refills: 0      acetaminophen (TYLENOL) 500 mg tablet Take 500 mg by mouth every six (6) hours as needed for Pain. aspirin delayed-release 81 mg tablet Take 81 mg by mouth daily. pantoprazole (PROTONIX) 40 mg tablet Take 40 mg by mouth daily. Indications: GASTROESOPHAGEAL REFLUX      ergocalciferol (ERGOCALCIFEROL) 50,000 unit capsule Take 50,000 Units by mouth every seven (7) days. Indications: VITAMIN D DEFICIENCY (HIGH DOSE THERAPY)      ipratropium (ATROVENT) 0.02 % nebulizer solution 0.5 mg by Nebulization route as needed for Wheezing. albuterol (PROVENTIL HFA, VENTOLIN HFA, PROAIR HFA) 90 mcg/actuation inhaler Take  by inhalation every six (6) hours as needed for Wheezing. Calcium Carbonate-Vit D3-Min 600 mg calcium- 400 unit tab Take 1 Tab by mouth two (2) times a day.      gabapentin (NEURONTIN) 300 mg capsule Take 300 mg by mouth three (3) times daily. NIFEdipine ER (ADALAT CC) 30 mg ER tablet Take  by mouth daily. cloNIDine (CATAPRES) 0.1 mg tablet Take 0.1 mg by mouth two (2) times a day.       POTASSIUM CHLORIDE SR 10 MEQ TAB        furosemide (LASIX) 20 mg tablet Take  by mouth daily. simvastatin (ZOCOR) 40 mg tablet Take 20 mg by mouth nightly. metFORMIN (GLUCOPHAGE) 500 mg tablet Take 1,000 mg by mouth two (2) times daily (with meals). fluticasone-salmeterol (ADVAIR DISKUS) 250-50 mcg/dose diskus inhaler Take 1 Puff by inhalation every twelve (12) hours. STOP taking these medications       loratadine (CLARITIN) 10 mg tablet Comments:   Reason for Stopping:                       Pending laboratory work and tests:  Intrinsic Factor Antibody, Celiac disease panel     Activity: Activity as tolerated. Ambulate with rolling walker. Fall precaution.  Please continue with PT/OT at SNF    Diet: Cardiac Diet, Diabetic Diet and Low fat, Low cholesterol    Wound Care: None needed    CPAP at Bedtime: patient is to have her autotitrated CPAP brought from home to be use in SNF        Tony Eubanks MD  8/7/2017  9:40 AM

## 2017-08-07 NOTE — DIABETES MGMT
GLYCEMIC CONTROL PLAN OF CARE    Assessment:  Pt is 76 yr old female admitted on 8/5/17 after falling and passing out. Pt with past medical history significant for asthma, T2DM, HF, Stomach ulcers, HTN, hypothyroidism, Body mass index is 47.65 kg/(m^2). - morbid obesity, ALEXA, Tobacco use, venous insufficiency. Recommendations:  Diabetic education. Continue correctional lispro ACHS. Will continue inpatient monitoring and intervention. Most recent blood glucose values:  Results for Danna Taylor (MRN [id number]) as of 8/7/2017 14:49   Ref.  Range 8/6/2017 15:39 8/6/2017 21:08 8/7/2017 07:38 8/7/2017 11:20   GLUCOSE,FAST - POC Latest Ref Range: 70 - 110 mg/dL 129 (H) 129 (H) 109 113 (H)     Current A1C:  6.4%- 8/7/17estimated average blood glucose of 137 mg/dl over the past 2-3 months    Current hospital diabetes medications:  Correctional lispro ACHS- normal insulin sensitivity scale    Diet:   Diabetic consistent carbohydrate    Home diabetes medications:  Pt reports taking 1000 mg metformin two times daily with meals    Goals:  Pt BG will be within target range by 8/10/17_____    Education:  _x__  Refer to Diabetes Education Record             ___  Education not indicated at this time    Amberly Benton [address], [address] [place] [address], [facility]  Pager: [phone]

## 2017-08-07 NOTE — PROGRESS NOTES
Problem: Self Care Deficits Care Plan (Adult)  Goal: *Acute Goals and Plan of Care (Insert Text)  Occupational Therapy Goals  Initiated 8/7/2017 within 7 day(s). 1. Patient will perform lower body dressing with modified [place]   OCCUPATIONAL THERAPY EVALUATION     Patient: Douglas Vazquez (63 y.o. female)  Date: 8/7/2017  Primary Diagnosis: Syncope  Precautions: fall        ASSESSMENT :  Based on the objective data described below, the patient presents with decreased ability to bend and reach her lower body. This limits her efficiency of maneuvering and her independence with her self care routine. Patient will benefit from skilled intervention to address the above impairments. Patients rehabilitation potential is considered to be Good  Factors which may influence rehabilitation potential include:   [X]             None noted  [ ]             Mental ability/status  [ ]             Medical condition  [ ]             Home/family situation and support systems  [ ]             Safety awareness  [ ]             Pain tolerance/management  [ ]             Other:        PLAN :  Recommendations and Planned Interventions:  [X]               Self Care Training                  [ ]        Therapeutic Activities  [ ]               Functional Mobility Training    [ ]        Cognitive Retraining  [ ]               Therapeutic Exercises           [ ]        Endurance Activities  [ ]               Balance Training                   [ ]        Neuromuscular Re-Education  [ ]               Visual/Perceptual Training     [ ]   Home Safety Training  [ ]               Patient Education                 [ ]        Family Training/Education  [ ]               Other (comment):     Frequency/Duration: Patient will be followed by occupational therapy 1-2 times per day/4-7 days per week to address goals.   Discharge Recommendations: skilled nursing facility  Further Equipment Recommendations for Discharge: N/A       PATIENT COMPLEXITY      Eval Complexity: History: LOW Complexity : Brief history review ; Examination: LOW Complexity : 1-3 performance deficits relating to physical, cognitive , or psychosocial skils that result in activity limitations and / or participation restrictions ; Decision Making:LOW Complexity : No comorbidities that affect functional and no verbal or physical assistance needed to complete eval tasks  Assessment: LOW Complexity       G-CODES:      Self Care  Current  CI= 1-19%   Goal  CI= 1-19%. The severity rating is based on the Level of Assistance required for Functional Mobility and ADLs. SUBJECTIVE:   Patient stated I can't get my socks or my pants on.      OBJECTIVE DATA SUMMARY:       Past Medical History:   Diagnosis Date    Asthma      COPD      [facility]      Diastolic heart failure ([facility]      Dyslipidemia associated with type 2 diabetes mellitus ([facility]      History of stomach ulcers      Hypertension      Hypothyroidism      Low back pain      Morbid obesity ([facility]      ALEXA (obstructive sleep apnea)      Personal history of tobacco use, presenting hazards to health      Sleep disturbance, unspecified      Type 2 diabetes mellitus with diabetic polyneuropathy (HCC)      Venous insufficiency (chronic) (peripheral)     History reviewed. No pertinent surgical history. Prior Level of Function/Home Situation: she reports she was independent at walker level although she could not get her pants or her socks on.  She reports sponge bathing prior to this admission  Home Situation  Home Environment: Private residence  One/Two Story Residence: One story  Living Alone: Yes  Support Systems: Child(vidhya)  Patient Expects to be Discharged to[de-identified] Private residence  Current DME Used/Available at Home: Arnol Ford, rolling  [X]  Right hand dominant          [ ]  Left hand dominant  Cognitive/Behavioral Status:   she is alert, oriented X 4   Skin: no skin integrity issues noted during OT evaluation  Edema: no extremity edema noted  Vision/Perceptual:     tracking is WFL     Coordination:   BUE's WFL   Balance:   independent standing for LB clothes management  Strength:  BUE's: 4 to 4-/5   Tone & Sensation:  BUE's WFL   Range of Motion:  BUE's AROM is WFL   Functional Mobility and Transfers for ADLs:  Bed Mobility:   supine to sit and return to supine is modified independent (additional time and effort) min assist for efficiency of maneuvering   Transfers:   sit to stand is modified independent and CG standing for side stepping in preparation for transfers (she uses a walker at home)   ADL Assessment:   self feeding: independent (simulated)  Grooming: independent (simulated at EOB level)  UB bathing/dressing: Independent (simulated)  LB bathing/dressing: min assist (she can reach to her forefoot only)  Pain:  0/10 pain prior to OT session  0/10 pain following OT session  Activity Tolerance:   She has SOB secondary to holding her breath during efforted movements; she recovers once she resumes her regular paced breathing  Please refer to the flowsheet for vital signs taken during this treatment. After treatment:   [ ] Patient left in no apparent distress sitting up in chair  [X] Patient left in no apparent distress in bed  [X] Call bell left within reach  [ ] Nursing notified  [ ] Caregiver present  [ ] Bed alarm activated      COMMUNICATION/EDUCATION:   [ ] Home safety education was provided and the patient/caregiver indicated understanding. [ ] Patient/family have participated as able in goal setting and plan of care. [X] Patient/family agree to work toward stated goals and plan of care. [ ] Patient understands intent and goals of therapy, but is neutral about his/her participation. [ ] Patient is unable to participate in goal setting and plan of care.      Thank you for this referral.  Heron Richmond, OTR/L  Time Calculation: 13 mins

## 2017-08-07 NOTE — PROGRESS NOTES
Referral for admission authorization sent to [facility] by Heidy Saldivar, this is the selection for placement by this pt.

## 2017-08-07 NOTE — PROGRESS NOTES
Problem: Mobility Impaired (Adult and Pediatric)  Goal: *Acute Goals and Plan of Care (Insert Text)  Physical Therapy Goals  Initiated 8/6/2017 and to be accomplished within 7 day(s)  1. Patient will move from supine to sit and sit to supine , scoot up and down and roll side to side in bed with modified independence. 2. Patient will transfer from bed to chair and chair to bed with supervision/set-up using the least restrictive device. 3. Patient will perform sit to stand with supervision/set-up. 4. Patient will ambulate with supervision/set-up for >50 feet with the least restrictive device. 5. Patient will ascend/descend 2 stairs with 1 handrail(s) with supervision/set-up. Outcome: Progressing Towards Goal  PHYSICAL THERAPY TREATMENT     Patient: Oscar Martin (68 y.o. female)  Date: 8/7/2017  Diagnosis: Syncope <principal problem not specified>  Precautions:     Chart, physical therapy assessment, plan of care and goals were reviewed. ASSESSMENT:  Patient presents today seated in recliner, daughter in room, agreeable to PT services. Patient performed sit to stand transfer using RW and Megha, ambulated 30 ft in room with CGA and VCs for breathing techniques as patient reported LE pain and anxiety with ambulation. Patient returned to recliner with CGA for eccentric control, trained in sit to stand techniques as patient states her chair at home is very low and difficult to get out of. Patient and daughter engaged in heated discussion regarding patient's activity level with PT attempt to diffuse situation through visual demonstration of techniques for increased independence. Patient performed sit to stand second time with CGA and proper technique, ambulated 30 ft within room again with RW and CGA. Patient left seated in recliner with LE elevated, call bell in reach, and daughter in room.   Progression toward goals:  [ ]      Improving appropriately and progressing toward goals  [X]      Improving slowly and progressing toward goals  [ ]      Not making progress toward goals and plan of care will be adjusted       PLAN:  Patient continues to benefit from skilled intervention to address the above impairments. Continue treatment per established plan of care. Discharge Recommendations:  Skilled Nursing Facility  Further Equipment Recommendations for Discharge:  N/A       SUBJECTIVE:   Patient stated I just feel so weak.       OBJECTIVE DATA SUMMARY:   Functional Mobility Training:  Transfers:  Sit to Stand: Minimum assistance  Stand to Sit: Contact guard assistance  Balance:  Sitting: Intact  Standing: Impaired; With support  Standing - Static: Fair  Standing - Dynamic : Fair  Ambulation/Gait Training:  Distance (ft): 30 Feet (ft) (X 2 trials)  Assistive Device: Walker, rolling  Ambulation - Level of Assistance: Contact guard assistance  Gait Abnormalities: Decreased step clearance;Trunk sway increased  Base of Support: Widened  Speed/Elsa: Pace decreased (<100 feet/min); Slow  Interventions: Safety awareness training;Visual/Demos; Verbal cues  Pain:  Pain Scale 1: Numeric (0 - 10)  Pain Intensity 1: 0  Pain Location 1: Leg  Activity Tolerance:   Fair  Please refer to the flowsheet for vital signs taken during this treatment.   After treatment:   [X] Patient left in no apparent distress sitting up in chair  [ ] Patient left in no apparent distress in bed  [X] Call bell left within reach  [X] Nursing notified Skyla Kahn RN)  [X] Caregiver present  [ ] Bed alarm activated      Carmen Severance   Time Calculation: 23 mins

## 2017-08-07 NOTE — PROGRESS NOTES
Problem: Falls - Risk of  Goal: *Absence of falls  Outcome: Progressing Towards Goal  Pt remains fall free, plan to increase mobilization

## 2017-08-08 VITALS
WEIGHT: 199.5 LBS | TEMPERATURE: 98 F | OXYGEN SATURATION: 94 % | HEART RATE: 69 BPM | DIASTOLIC BLOOD PRESSURE: 66 MMHG | RESPIRATION RATE: 18 BRPM | HEIGHT: 55 IN | SYSTOLIC BLOOD PRESSURE: 104 MMHG | BODY MASS INDEX: 46.17 KG/M2

## 2017-08-08 LAB
GLUCOSE BLD STRIP.AUTO-MCNC: 104 MG/DL (ref 70–110)
GLUCOSE BLD STRIP.AUTO-MCNC: 115 MG/DL (ref 70–110)
IF BLOCK AB SER-ACNC: 1 AU/ML (ref 0–1.1)

## 2017-08-08 PROCEDURE — 94640 AIRWAY INHALATION TREATMENT: CPT

## 2017-08-08 PROCEDURE — 82962 GLUCOSE BLOOD TEST: CPT

## 2017-08-08 PROCEDURE — 74011250637 HC RX REV CODE- 250/637: Performed by: INTERNAL MEDICINE

## 2017-08-08 PROCEDURE — 96372 THER/PROPH/DIAG INJ SC/IM: CPT

## 2017-08-08 PROCEDURE — 99218 HC RM OBSERVATION: CPT

## 2017-08-08 PROCEDURE — 97116 GAIT TRAINING THERAPY: CPT

## 2017-08-08 PROCEDURE — 74011000250 HC RX REV CODE- 250: Performed by: INTERNAL MEDICINE

## 2017-08-08 PROCEDURE — 74011250636 HC RX REV CODE- 250/636: Performed by: INTERNAL MEDICINE

## 2017-08-08 RX ORDER — TRAMADOL HYDROCHLORIDE 50 MG/1
50 TABLET ORAL
Qty: 42 TAB | Refills: 0 | Status: SHIPPED | OUTPATIENT
Start: 2017-08-08 | End: 2022-06-26

## 2017-08-08 RX ADMIN — TRAMADOL HYDROCHLORIDE 50 MG: 50 TABLET, FILM COATED ORAL at 06:16

## 2017-08-08 RX ADMIN — ALBUTEROL SULFATE 2.5 MG: 2.5 SOLUTION RESPIRATORY (INHALATION) at 09:57

## 2017-08-08 RX ADMIN — BUDESONIDE AND FORMOTEROL FUMARATE DIHYDRATE 2 PUFF: 160; 4.5 AEROSOL RESPIRATORY (INHALATION) at 10:04

## 2017-08-08 RX ADMIN — NIFEDIPINE 30 MG: 30 TABLET, FILM COATED, EXTENDED RELEASE ORAL at 08:50

## 2017-08-08 RX ADMIN — GABAPENTIN 300 MG: 300 CAPSULE ORAL at 08:50

## 2017-08-08 RX ADMIN — HEPARIN SODIUM 5000 UNITS: 5000 INJECTION, SOLUTION INTRAVENOUS; SUBCUTANEOUS at 00:01

## 2017-08-08 RX ADMIN — FUROSEMIDE 20 MG: 20 TABLET ORAL at 08:50

## 2017-08-08 RX ADMIN — HEPARIN SODIUM 5000 UNITS: 5000 INJECTION, SOLUTION INTRAVENOUS; SUBCUTANEOUS at 08:50

## 2017-08-08 RX ADMIN — PANTOPRAZOLE SODIUM 40 MG: 40 TABLET, DELAYED RELEASE ORAL at 08:50

## 2017-08-08 RX ADMIN — CLONIDINE HYDROCHLORIDE 0.1 MG: 0.1 TABLET ORAL at 08:50

## 2017-08-08 RX ADMIN — POLYETHYLENE GLYCOL 3350 17 G: 17 POWDER, FOR SOLUTION ORAL at 08:50

## 2017-08-08 RX ADMIN — ASPIRIN 81 MG: 81 TABLET, COATED ORAL at 08:50

## 2017-08-08 RX ADMIN — FERROUS SULFATE TAB 325 MG (65 MG ELEMENTAL FE) 325 MG: 325 (65 FE) TAB at 08:50

## 2017-08-08 NOTE — PROGRESS NOTES
Hospitalist Progress Note    Patient: Katerin Ram Age: 76 y.o. : 1940 MR#: [id number] SSN: [id number]  Date/Time: 2017 12:56 PM    Subjective:     \"I feel good, I participate with my rehab. \"  No overnight event. Patient was discharged yesterday, [facility] authorization approved yesterday. ROS: no fever, no HA, no dizziness, no swallowing problem, no chest pain, no palpitation,  No shortness of breath, no abd pain, no diarrhea, no constipation, no urinary complaint.  No leg pain    Objective:   VS:   Visit Vitals    /66 (BP 1 Location: Left arm, BP Patient Position: Sitting)    Pulse 69    Temp 98 °F (36.7 °C)    Resp 18    Ht 4' 7\" (1.397 m)    Wt 90.5 kg (199 lb 8 oz)    SpO2 94%    BMI 46.37 kg/m2      Tmax/24hrs: Temp (24hrs), Av.9 °F (36.6 °C), Min:97.6 °F (36.4 °C), Max:98.2 °F (36.8 °C)      Intake/Output Summary (Last 24 hours) at 17 1256  Last data filed at 17 3110   Gross per 24 hour   Intake             1500 ml   Output             2800 ml   Net            -1300 ml     Telemetry with PVC  General:  Cooperative, not in distress, speaks in full sentences, daughter at bedside  HEENT: PERRL, EOMI, supple large neck  Cardiovascular:  S1S2 regular, no rub/gallop  Pulmonary:  Air entry appreciate, no wheezing  GI:  Soft, non tender, non distended, +bs  Extremities:  No pedal edema, distal pulses appreciated  Neuro: AOx3, no gross deficit appreciate    Additional:   Current Facility-Administered Medications   Medication Dose Route Frequency    hydrALAZINE (APRESOLINE) 20 mg/mL injection 10 mg  10 mg IntraVENous Q6H PRN    furosemide (LASIX) tablet 20 mg  20 mg Oral DAILY    ferrous sulfate tablet 325 mg  1 Tab Oral BID WITH MEALS    aspirin delayed-release tablet 81 mg  81 mg Oral DAILY    cloNIDine HCl (CATAPRES) tablet 0.1 mg  0.1 mg Oral BID    budesonide-formoterol (SYMBICORT) 160-4.5 mcg/actuation HFA inhaler 2 Puff  2 Puff Inhalation BID RT    gabapentin (NEURONTIN) capsule 300 mg  300 mg Oral TID    NIFEdipine ER (PROCARDIA XL) tablet 30 mg  30 mg Oral DAILY    pantoprazole (PROTONIX) tablet 40 mg  40 mg Oral DAILY    simvastatin (ZOCOR) tablet 20 mg  20 mg Oral QHS    traMADol (ULTRAM) tablet 50 mg  50 mg Oral Q6H PRN    heparin (porcine) injection 5,000 Units  5,000 Units SubCUTAneous Q8H    insulin lispro (HUMALOG) injection   SubCUTAneous AC&HS    glucose chewable tablet 16 g  4 Tab Oral PRN    glucagon (GLUCAGEN) injection 1 mg  1 mg IntraMUSCular PRN    dextrose (D50W) injection syrg 12.5-25 g  25-50 mL IntraVENous PRN    albuterol (PROVENTIL VENTOLIN) nebulizer solution 2.5 mg  2.5 mg Nebulization Q6HWA RT    nystatin (MYCOSTATIN) 100,000 unit/gram cream   Topical TID    polyethylene glycol (MIRALAX) packet 17 g  17 g Oral DAILY          Labs:    Recent Results (from the past 24 hour(s))   GLUCOSE, POC    Collection Time: 08/07/17  3:04 PM   Result Value Ref Range    Glucose (POC) 111 (H) 70 - 110 mg/dL   GLUCOSE, POC    Collection Time: 08/07/17  9:28 PM   Result Value Ref Range    Glucose (POC) 153 (H) 70 - 110 mg/dL   GLUCOSE, POC    Collection Time: 08/08/17  6:15 AM   Result Value Ref Range    Glucose (POC) 104 70 - 110 mg/dL   GLUCOSE, POC    Collection Time: 08/08/17 11:18 AM   Result Value Ref Range    Glucose (POC) 115 (H) 70 - 110 mg/dL     Duplex Legs:  INTERPRETATION/FINDINGS  Duplex images were obtained using 2-D gray scale, color flow, and  spectral Doppler analysis. Right leg :  1. Deep vein(s) visualized include the common femoral, proximal  femoral, mid femoral, distal femoral, popliteal(above knee),  popliteal(fossa), popliteal(below knee), posterior tibial and peroneal veins. 2. No evidence of deep venous thrombosis detected in the veins visualized. 3. Superficial vein(s) visualized include the great saphenous vein. 4. No evidence of superficial thrombosis detected. Left leg :  1.  Deep vein(s) visualized include the common femoral, proximal  femoral, mid femoral, distal femoral, popliteal(above knee),  popliteal(fossa), popliteal(below knee), posterior tibial and peroneal veins. 2. No evidence of deep venous thrombosis detected in the veins visualized. 3. Superficial vein(s) visualized include the great saphenous vein. 4. No evidence of superficial thrombosis detected. CTA chest:  FINDINGS:  The lungs are clear. Pulmonary vascular opacification is satisfactory. There are no pulmonary emboli. There is no aneurysm or dissection of the  thoracic aorta. .  No pleural pathology is evident. There is no mediastinal adenopathy or mass  Sections through the upper abdomen reveal no acute findings. Cholelithiasis  present  IMPRESSION:  Negative for pulmonary emboli. Findings indicating a definite cause for dyspnea  on exertion are not identified. Duplex carotid:   INTERPRETATION/FINDINGS  Duplex images were obtained using 2-D gray scale, color flow, and  spectral Doppler analysis. 1. Bilateral <50% stenosis of the internal carotid arteries. 2. No significant stenosis in the external carotid arteries  bilaterally. 3. Antegrade flow in both vertebral arteries. 4. Normal flow in both subclavian arteries. Plaque Morphology:   1. Hyperechoic plaque in the bulb and right ICA. 2. Hyperechoic plaque in the bulb and left ICA. Assessment/Plan:       1. Syncope with collapse, resolved  2. Left leg pain and swelling, resolved   3. HTN, stable on home medications  4. Chronic diastolic heart failure, proBNP is normal      -resume home medications  5. Hyperlipidemia, on statin  6. Iron deficiency anemia with Vitamin B12 deficiency       - on iron and vitamin B12 replacement  7. Type 2 DM, on ISS, hold home Metformin  8. ALEXA, on CPAP, tolerated well      - spoke to daughter to bring her home CPAP to be used in facility  9. COPD/Asthma, stable and continue home medications  10.   Left forehead laceration from fall, resolved  11. Physical deconditioning,      - PT at SN    Disposition: SNF today.        Additional Notes:    Time spent 25 minutes      Case discussed with:  [x]Patient  [x]Family  [x]Nursing  [x]Case Management  DVT Prophylaxis:  []Lovenox  [x]Hep SQ  []SCDs  []Coumadin   []On Heparin gtt    Signed By: Ruben Lopez MD     August 8, 2017 12:56 PM

## 2017-08-08 NOTE — PROGRESS NOTES
Problem: Mobility Impaired (Adult and Pediatric)  Goal: *Acute Goals and Plan of Care (Insert Text)  Physical Therapy Goals  Initiated 8/6/2017 and to be accomplished within 7 day(s)  1. Patient will move from supine to sit and sit to supine , scoot up and down and roll side to side in bed with modified independence. 2. Patient will transfer from bed to chair and chair to bed with supervision/set-up using the least restrictive device. 3. Patient will perform sit to stand with supervision/set-up. 4. Patient will ambulate with supervision/set-up for >50 feet with the least restrictive device. 5. Patient will ascend/descend 2 stairs with 1 handrail(s) with supervision/set-up. Outcome: Progressing Towards Goal  PHYSICAL THERAPY TREATMENT     Patient: Dennys Oliveira (89 y.o. female)  Date: 8/8/2017  Diagnosis: Syncope <principal problem not specified>  Precautions:     Chart, physical therapy assessment, plan of care and goals were reviewed. ASSESSMENT:  Patient presents today seated on [facility], agreeable to PT services. She required CGA for sit to stand transfer from [facility], [address] [place] [address] for [facility]. She then ambulated ~80 ft in hallway with RW and CGA, required VCs for activity pacing and safe technique of assistive device. Patient continues to demonstrate decreased safety with RW as she prefers to keep walker too far forward despite consistent cuing. She required one standing rest break secondary to OTTO, however recovered quickly with training in deep breathing. Patient returned to recliner with CGA for eccentric control, left with LE elevated, daughter in room, and call bell in reach. Progression toward goals:  [X]      Improving appropriately and progressing toward goals  [ ]      Improving slowly and progressing toward goals  [ ]      Not making progress toward goals and plan of care will be adjusted       PLAN:  Patient continues to benefit from skilled intervention to address the above impairments.   Continue treatment per established plan of care. Discharge Recommendations:  Home Health  Further Equipment Recommendations for Discharge:  rolling walker       SUBJECTIVE:   Patient stated Pratibha Fay, I thought I got maybe 30 ft, after being informed she just ambulated [de-identified] ft. OBJECTIVE DATA SUMMARY:   Functional Mobility Training:  Bed Mobility:   N/T: patient on BSC at initiation of session, left seated in recliner at conclusion. Transfers:  Sit to Stand: Contact guard assistance (from [facility])  Stand to Sit: Contact guard assistance (recliner)  Balance:  Sitting: Intact; Without support  Sitting - Static: Good (unsupported)  Sitting - Dynamic: Good (unsupported)  Standing: Impaired; With support  Standing - Static: Fair (Fair +)  Standing - Dynamic : Fair  Ambulation/Gait Training:  Distance (ft): 80 Feet (ft)  Assistive Device: Walker, rolling  Ambulation - Level of Assistance: Contact guard assistance  Gait Abnormalities: Decreased step clearance;Trunk sway increased  Base of Support: Widened  Speed/Elsa: Pace decreased (<100 feet/min); Slow  Interventions: Safety awareness training;Verbal cues  Pain:  Pain Scale 1: Numeric (0 - 10)  Pain Intensity 1: 0  Activity Tolerance:   Fair/good  Please refer to the flowsheet for vital signs taken during this treatment.   After treatment:   [X] Patient left in no apparent distress sitting up in chair  [ ] Patient left in no apparent distress in bed  [X] Call bell left within reach  [X] Nursing notified Misbah Gilbert RN)  [X] Caregiver present  [ ] Bed alarm activated      Esteban Shetty   Time Calculation: 16 mins

## 2017-08-08 NOTE — PROGRESS NOTES
Report given to [facility]. Pt tx and with care of [facility], all belongings and discharge paper sent with EMS.

## 2017-08-09 LAB
GLIADIN PEPTIDE IGA SER-ACNC: 5 UNITS (ref 0–19)
GLIADIN PEPTIDE IGG SER-ACNC: 2 UNITS (ref 0–19)
IGA SERPL-MCNC: 322 MG/DL (ref 64–422)
TTG IGA SER-ACNC: <2 U/ML (ref 0–3)
TTG IGG SER-ACNC: <2 U/ML (ref 0–5)

## 2017-11-20 ENCOUNTER — OFFICE VISIT (OUTPATIENT)
Dept: PULMONOLOGY | Age: 77
End: 2017-11-20

## 2017-11-20 VITALS
WEIGHT: 200 LBS | RESPIRATION RATE: 20 BRPM | DIASTOLIC BLOOD PRESSURE: 60 MMHG | HEART RATE: 96 BPM | OXYGEN SATURATION: 94 % | HEIGHT: 55 IN | BODY MASS INDEX: 46.29 KG/M2 | SYSTOLIC BLOOD PRESSURE: 110 MMHG | TEMPERATURE: 98.1 F

## 2017-11-20 DIAGNOSIS — J44.9 CHRONIC OBSTRUCTIVE PULMONARY DISEASE, UNSPECIFIED COPD TYPE (HCC): ICD-10-CM

## 2017-11-20 DIAGNOSIS — L98.9 SKIN ABNORMALITY: ICD-10-CM

## 2017-11-20 DIAGNOSIS — G47.33 OSA (OBSTRUCTIVE SLEEP APNEA): Primary | ICD-10-CM

## 2017-11-20 NOTE — PATIENT INSTRUCTIONS
Advair 1 inhalation twice daily. Remember to exhale fully like your blowing out candles before you put the Advair inhaler in your mouth then breathing hard and fast and hold your breath for 5 seconds.   After you have inhaled the Advair wash her mouth with water and spit it out    Albuterol by nebulization or by hand-held inhaler (2 puffs) every 4 hours as needed but if you require albuterol too often to control your respiratory symptoms call the office for severe symptoms go to the emergency room    Always call for symptoms such as increasing shortness of breath or cough productive of discolored mucus    Use her CPAP nightly

## 2017-11-20 NOTE — PROGRESS NOTES
Addendum created to place referral per Dr. Zoe Tamez verbal orders, also placed DME order for new cpap mask.

## 2017-11-20 NOTE — PROGRESS NOTES
The pt. C/o several areas of a skin problems on her inner bilat groin area and back of her head   The mycostatin cream is not effective. She wishes to go to a Dermatologist.    \" I haven't been wearing the CPap much, lately\".

## 2017-11-20 NOTE — PROGRESS NOTES
[facility]  Pulmonary, Critical Care, and Sleep Medicine      Chief complaint:  COPD and ALEXA    HPI:    Jenn Reardon    is 76years old returns the office today for follow-up concerning COPD and ALEXA and reports a mild cough which is usually nonproductive and denies any leg pain or swelling except for minimal swelling and also reports shortness of breath with exertion which is improved significantly. She relates she uses her nebulizer 4 times a day and uses her Advair regularly. She also relates she has chest pains when she gets upset about several times months. She reports that she does not use her CPAP regularly      Allergies   Allergen Reactions    Lisinopril Angioedema     Document from PCP record     Current Outpatient Prescriptions   Medication Sig    traMADol (ULTRAM) 50 mg tablet Take 1 Tab by mouth every six (6) hours as needed for Pain. Max Daily Amount: 200 mg. Indications: Pain    albuterol (PROVENTIL VENTOLIN) 2.5 mg /3 mL (0.083 %) nebulizer solution 3 mL by Nebulization route every six (6) hours. Indications: BRONCHOSPASM PREVENTION    nystatin (MYCOSTATIN) topical cream Apply 1 g to affected area three (3) times daily. Apply under her breasts    ferrous sulfate 325 mg (65 mg iron) tablet Take 1 Tab by mouth two (2) times daily (with meals). Indications: IRON DEFICIENCY ANEMIA    polyethylene glycol (MIRALAX) 17 gram packet Take 1 Packet by mouth daily. Indications: constipation    cyanocobalamin (VITAMIN B-12) 1,000 mcg/mL injection 1 mL by IntraMUSCular route every seven (7) days. Indications: VITAMIN B12 DEFICIENCY    cpap machine kit by Does Not Apply route. Change to Auto CPAP 5-20 and please provide new mask. Lots of leak on current Mask. (Patient taking differently: by Does Not Apply route. Change to Auto CPAP 5-20 and please provide new mask. Lots of leak on current Mask.  First Choice O2)    acetaminophen (TYLENOL) 500 mg tablet Take 500 mg by mouth every six (6) hours as needed for Pain.  aspirin delayed-release 81 mg tablet Take 81 mg by mouth daily.  pantoprazole (PROTONIX) 40 mg tablet Take 40 mg by mouth daily. Indications: GASTROESOPHAGEAL REFLUX    ergocalciferol (ERGOCALCIFEROL) 50,000 unit capsule Take 50,000 Units by mouth every seven (7) days. Indications: VITAMIN D DEFICIENCY (HIGH DOSE THERAPY)    ipratropium (ATROVENT) 0.02 % nebulizer solution 0.5 mg by Nebulization route as needed for Wheezing.  albuterol (PROVENTIL HFA, VENTOLIN HFA, PROAIR HFA) 90 mcg/actuation inhaler Take  by inhalation every six (6) hours as needed for Wheezing.  Calcium Carbonate-Vit D3-Min 600 mg calcium- 400 unit tab Take 1 Tab by mouth two (2) times a day.  gabapentin (NEURONTIN) 300 mg capsule Take 300 mg by mouth three (3) times daily.  NIFEdipine ER (ADALAT CC) 30 mg ER tablet Take  by mouth daily.  cloNIDine (CATAPRES) 0.1 mg tablet Take 0.1 mg by mouth two (2) times a day.  POTASSIUM CHLORIDE SR 10 MEQ TAB      furosemide (LASIX) 20 mg tablet Take  by mouth daily.  simvastatin (ZOCOR) 40 mg tablet Take 20 mg by mouth nightly.  metFORMIN (GLUCOPHAGE) 500 mg tablet Take 1,000 mg by mouth two (2) times daily (with meals).  fluticasone-salmeterol (ADVAIR DISKUS) 250-50 mcg/dose diskus inhaler Take 1 Puff by inhalation every twelve (12) hours. No current facility-administered medications for this visit.       Past Medical History:   Diagnosis Date    Asthma     COPD     Diabetes mellitus ([address]     Diastolic heart failure (HCC)     Dyslipidemia associated with type 2 diabetes mellitus ([address]     History of stomach ulcers     Hypertension     Hypothyroidism     Low back pain     Morbid obesity ([address]     ALEXA (obstructive sleep apnea)     Personal history of tobacco use, presenting hazards to health     Sleep disturbance, unspecified     Type 2 diabetes mellitus with diabetic polyneuropathy (HCC)     Venous insufficiency (chronic) (peripheral)      No past surgical history on file. Social History     Social History    Marital status:      Spouse name: N/A    Number of children: N/A    Years of education: N/A     Occupational History    Not on file. Social History Main Topics    Smoking status: Former Smoker     Packs/day: 1.50     Years: 60.00     Types: Cigarettes     Start date: 9/24/1952     Quit date: 7/1/2016    Smokeless tobacco: Never Used    Alcohol use No    Drug use: No    Sexual activity: Not on file     Other Topics Concern    Not on file     Social History Narrative     Family History   Problem Relation Age of Onset    Diabetes Other     Breast Cancer Sister     Cancer Brother        Review of systems:  She denies fever chills poor appetite or weight loss    Physical Exam:  Visit Vitals    /60 (BP 1 Location: Left arm, BP Patient Position: Sitting)    Pulse 96    Temp 98.1 °F (36.7 °C)    Resp 20    Ht 4' 7\" (1.397 m)    Wt 90.7 kg (200 lb)    SpO2 94%    BMI 46.48 kg/m2       Well-developed and obese  HEENT: WNL  Lymph node exam: Supraclavicular cervical lymph nodes negative  Chest: Equal symmetrical expansion no dullness no wheezes rales rubs  Heart: Regular rhythm no gallop no murmur no JVD 0 to trace peripheral edema bilateral  Extremities: No cyanosis clubbing or calf tenderness  Neurological: Alert and oriented    Labs:    O2 sat room air at rest 94%    Impression:     ALEXA untreated  COPD stable despite not using Advair regularly    Plan:     The patient was encouraged to use Advair twice a day with proper technique and to use her albuterol only on a as needed basis  She was encouraged to use her CPAP and will try to look for  wait for her to try masks that may be more comfortable for her  Follow-up in 6 months or sooner if needed    Nisha Nayak MD , [facility]    CC: No primary care provider on file. [address] [facility] [address]  [place], [address] [address],[place] [address]     P: [phone]     F: [phone]

## 2017-11-20 NOTE — MR AVS SNAPSHOT
Visit Information Date & Time Provider Department Dept. Phone Encounter #  
 11/20/2017 12:00 PM Caprice Villanueva MD [facility]monary [facility]ists Lore Yang [id number] Follow-up Instructions Return in about 6 months (around 5/20/2018). Follow-up and Disposition History Upcoming Health Maintenance Date Due  
 FOOT EXAM Q1 5/28/1950 EYE EXAM RETINAL OR DILATED Q1 5/28/1948 ZOSTER VACCINE AGE 60> 3/28/1998 GLAUCOMA SCREENING Q2Y 5/28/2003 Pneumococcal 65+ Low/Medium Risk (1 of 2 - PCV13) 5/28/2003 MEDICARE YEARLY EXAM 5/28/2003 MICROALBUMIN Q1 3/25/2012 LIPID PANEL Q1 3/25/2012 Influenza Age 5 to Adult 8/1/2017 HEMOGLOBIN A1C Q6M 2/7/2018 DTaP/Tdap/Td series (2 - Td) 8/5/2027 Allergies as of 11/20/2017  Review Complete On: 11/20/2017 By: Joni Paz LPN Severity Noted Reaction Type Reactions Lisinopril High 05/22/2016    Angioedema Document from PCP record Current Immunizations  Never Reviewed Name Date Tdap 8/5/2017  7:51 AM  
  
 Not reviewed this visit You Were Diagnosed With   
  
 Codes Comments ALEXA (obstructive sleep apnea)    -  Primary ICD-10-CM: G47.33 
ICD-9-CM: 327.23 Chronic obstructive pulmonary disease, unspecified COPD type ([facility]     ICD-10-CM: J44.9 ICD-9-CM: 293 Vitals BP Pulse Temp Resp Height(growth percentile) Weight(growth percentile) 110/60 (BP 1 Location: Left arm, BP Patient Position: Sitting) 96 98.1 °F (36.7 °C) 20 4' 7\" (1.397 m) 200 lb (90.7 kg) SpO2 BMI OB Status Smoking Status 94% 46.48 kg/m2 Postmenopausal Former Smoker BMI and BSA Data Body Mass Index Body Surface Area  
 46.48 kg/m 2 1.88 m 2 Your Updated Medication List  
  
   
This list is accurate as of: 11/20/17  1:43 PM.  Always use your most recent med list.  
  
  
  
  
 acetaminophen 500 mg tablet Commonly known as:  TYLENOL  
 Take 500 mg by mouth every six (6) hours as needed for Pain. ADVAIR DISKUS 250-50 mcg/dose diskus inhaler Generic drug:  fluticasone-salmeterol Take 1 Puff by inhalation every twelve (12) hours. * albuterol 90 mcg/actuation inhaler Commonly known as:  PROVENTIL HFA, VENTOLIN HFA, PROAIR HFA Take  by inhalation every six (6) hours as needed for Wheezing. * albuterol 2.5 mg /3 mL (0.083 %) nebulizer solution Commonly known as:  PROVENTIL VENTOLIN  
3 mL by Nebulization route every six (6) hours. Indications: BRONCHOSPASM PREVENTION  
  
 aspirin delayed-release 81 mg tablet Take 81 mg by mouth daily. Calcium Carbonate-Vit D3-Min 600 mg calcium- 400 unit Tab Take 1 Tab by mouth two (2) times a day. cloNIDine HCl 0.1 mg tablet Commonly known as:  CATAPRES Take 0.1 mg by mouth two (2) times a day. cpap machine kit  
by Does Not Apply route. Change to Auto CPAP 5-20 and please provide new mask. Lots of leak on current Mask. cyanocobalamin 1,000 mcg/mL injection Commonly known as:  VITAMIN B-12  
1 mL by IntraMUSCular route every seven (7) days. Indications: VITAMIN B12 DEFICIENCY  
  
 ergocalciferol 50,000 unit capsule Commonly known as:  ERGOCALCIFEROL Take 50,000 Units by mouth every seven (7) days. Indications: VITAMIN D DEFICIENCY (HIGH DOSE THERAPY)  
  
 ferrous sulfate 325 mg (65 mg iron) tablet Take 1 Tab by mouth two (2) times daily (with meals). Indications: IRON DEFICIENCY ANEMIA  
  
 gabapentin 300 mg capsule Commonly known as:  NEURONTIN Take 300 mg by mouth three (3) times daily. ipratropium 0.02 % Soln Commonly known as:  ATROVENT  
0.5 mg by Nebulization route as needed for Wheezing. LASIX 20 mg tablet Generic drug:  furosemide Take  by mouth daily. metFORMIN 500 mg tablet Commonly known as:  GLUCOPHAGE Take 1,000 mg by mouth two (2) times daily (with meals). NIFEdipine ER 30 mg ER tablet Commonly known as:  ADALAT CC Take  by mouth daily. nystatin topical cream  
Commonly known as:  MYCOSTATIN Apply 1 g to affected area three (3) times daily. Apply under her breasts  
  
 pantoprazole 40 mg tablet Commonly known as:  PROTONIX Take 40 mg by mouth daily. Indications: GASTROESOPHAGEAL REFLUX polyethylene glycol 17 gram packet Commonly known as:  [place] Clos Take 1 Packet by mouth daily. Indications: constipation POTASSIUM CHLORIDE SR 10 MEQ TAB  
  
 simvastatin 40 mg tablet Commonly known as:  ZOCOR Take 20 mg by mouth nightly. traMADol 50 mg tablet Commonly known as:  ULTRAM  
Take 1 Tab by mouth every six (6) hours as needed for Pain. Max Daily Amount: 200 mg. Indications: Pain * Notice: This list has 2 medication(s) that are the same as other medications prescribed for you. Read the directions carefully, and ask your doctor or other care provider to review them with you. Follow-up Instructions Return in about 6 months (around 5/20/2018). Patient Instructions Advair 1 inhalation twice daily. Remember to exhale fully like your blowing out candles before you put the Advair inhaler in your mouth then breathing hard and fast and hold your breath for 5 seconds. After you have inhaled the Advair wash her mouth with water and spit it out Albuterol by nebulization or by hand-held inhaler (2 puffs) every 4 hours as needed but if you require albuterol too often to control your respiratory symptoms call the office for severe symptoms go to the emergency room Always call for symptoms such as increasing shortness of breath or cough productive of discolored mucus Use her CPAP nightly Introducing [facility]! [facility] introduces Im[employer]ur patient portal. Now you can access parts of your medical record, email your doctor's office, and request medication refills online.    
 
1. In your internet browser, go to [email or web address] 2. Click on the First Time User? Click Here link in the Sign In box. You will see the New Member Sign Up page. 3. Enter your Yhat Access Code exactly as it appears below. You will not need to use this code after youve completed the sign-up process. If you do not sign up before the expiration date, you must request a new code. · Yhat Access Code: [email or web address] Expires: 2/18/2018  1:43 PM 
 
4. Enter the last four digits of your Social Security Number (xxxx) and Date of Birth (mm/dd/yyyy) as indicated and click Submit. You will be taken to the next sign-up page. 5. Create a "[employer]t ID. This will be your Yhat login ID and cannot be changed, so think of one that is secure and easy to remember. 6. Create a Yhat password. You can change your password at any time. 7. Enter your Password Reset Question and Answer. This can be used at a later time if you forget your password. 8. Enter your e-mail address. You will receive e-mail notification when new information is available in [address] 9. Click Sign Up. You can now view and download portions of your medical record. 10. Click the Download Summary menu link to download a portable copy of your medical information. If you have questions, please visit the Frequently Asked Questions section of the Yhat website. Remember, Yhat is NOT to be used for urgent needs. For medical emergencies, dial 911. Now available from your iPhone and Android! Please provide this summary of care documentation to your next provider. If you have any questions after today's visit, please call [phone].

## 2018-03-29 ENCOUNTER — HOSPITAL ENCOUNTER (OUTPATIENT)
Dept: BONE DENSITY | Age: 78
Discharge: HOME | End: 2018-03-29
Attending: FAMILY MEDICINE
Payer: MEDICARE

## 2018-03-29 DIAGNOSIS — N95.9 PERIMENOPAUSAL DISORDER: ICD-10-CM

## 2018-03-29 PROCEDURE — 77080 DXA BONE DENSITY AXIAL: CPT

## 2018-05-08 ENCOUNTER — TELEPHONE (OUTPATIENT)
Dept: PULMONOLOGY | Age: 78
End: 2018-05-08

## 2018-05-08 NOTE — TELEPHONE ENCOUNTER
Pt daughter Karen Gonzalez ([phone]) called stating that someone was supposed to be coming to fit her mother for a new mask for her cpap machine but that they have not heard anything yet.

## 2018-08-06 ENCOUNTER — HOSPITAL ENCOUNTER (OUTPATIENT)
Dept: ULTRASOUND IMAGING | Age: 78
Discharge: HOME | End: 2018-08-06
Attending: FAMILY MEDICINE
Payer: MEDICARE

## 2018-08-06 ENCOUNTER — HOSPITAL ENCOUNTER (OUTPATIENT)
Dept: MAMMOGRAPHY | Age: 78
Discharge: HOME | End: 2018-08-06
Attending: FAMILY MEDICINE
Payer: MEDICARE

## 2018-08-06 DIAGNOSIS — N64.4 MASTODYNIA: ICD-10-CM

## 2018-08-06 PROCEDURE — 76642 ULTRASOUND BREAST LIMITED: CPT

## 2018-08-06 PROCEDURE — 77065 DX MAMMO INCL CAD UNI: CPT

## 2018-08-06 PROCEDURE — 77066 DX MAMMO INCL CAD BI: CPT

## 2019-01-15 ENCOUNTER — OFFICE VISIT (OUTPATIENT)
Dept: ORTHOPEDIC SURGERY | Facility: CLINIC | Age: 79
End: 2019-01-15

## 2019-01-15 VITALS
TEMPERATURE: 95.9 F | WEIGHT: 200 LBS | HEART RATE: 60 BPM | SYSTOLIC BLOOD PRESSURE: 134 MMHG | OXYGEN SATURATION: 93 % | DIASTOLIC BLOOD PRESSURE: 70 MMHG | HEIGHT: 55 IN | RESPIRATION RATE: 16 BRPM | BODY MASS INDEX: 46.29 KG/M2

## 2019-01-15 DIAGNOSIS — M25.512 LEFT SHOULDER PAIN, UNSPECIFIED CHRONICITY: Primary | ICD-10-CM

## 2019-01-15 PROBLEM — E66.01 OBESITY, MORBID (HCC): Status: ACTIVE | Noted: 2019-01-15

## 2019-01-15 RX ORDER — NAPROXEN 500 MG/1
TABLET ORAL
COMMUNITY
Start: 2019-01-04 | End: 2022-08-30

## 2019-01-15 RX ORDER — TRIAMCINOLONE ACETONIDE 40 MG/ML
40 INJECTION, SUSPENSION INTRA-ARTICULAR; INTRAMUSCULAR ONCE
Qty: 1 ML | Refills: 0
Start: 2019-01-15 | End: 2019-01-15

## 2019-01-15 RX ORDER — LORATADINE 10 MG/1
10 TABLET ORAL
COMMUNITY
Start: 2010-04-14

## 2019-01-15 NOTE — PROGRESS NOTES
Patient: Abebe Hicks                MRN: [id number]       SSN: [id number]  YOB: 1940        AGE: [de-identified] y.o. SEX: female  Body mass index is 46.48 kg/m². PCP: Hurley Halsted, MD  01/15/19    Chief Complaint: Left shoulder pain    HISTORY OF PRESENT ILLNESS:   Kellie Rodriguez is an [de-identified]year-old female who comes in today with left shoulder pain, as well as bilateral hand numbness and weakness. The left shoulder pain she rates as 3/10. It has been present for years. She has not had any advanced imaging of it. She has difficulty with raising her left arm above shoulder height. She notices some catching and clicking in her shoulder at times. She also notices some numbness and weakness in both of her hands. She says it is all of her fingers. She has been dropping things lately. Past Medical History:   Diagnosis Date    Asthma     COPD     Diabetes mellitus ([facility]     Diastolic heart failure (HCC)     Dyslipidemia associated with type 2 diabetes mellitus ([facility]     History of stomach ulcers     Hypertension     Hypothyroidism     Low back pain     Morbid obesity (HCC)     ALEXA (obstructive sleep apnea)     Personal history of tobacco use, presenting hazards to health     Sleep disturbance, unspecified     Type 2 diabetes mellitus with diabetic polyneuropathy (HCC)     Venous insufficiency (chronic) (peripheral)        Family History   Problem Relation Age of Onset    Diabetes Other     Breast Cancer Sister     Cancer Brother        Current Outpatient Medications   Medication Sig Dispense Refill    loratadine (CLARITIN) 10 mg tablet 10 mg.      naproxen (NAPROSYN) 500 mg tablet       triamcinolone acetonide (KENALOG) 40 mg/mL injection 1 mL by Intra artICUlar route once for 1 dose. 1 mL 0    albuterol (PROVENTIL VENTOLIN) 2.5 mg /3 mL (0.083 %) nebulizer solution 3 mL by Nebulization route every six (6) hours.  Indications: BRONCHOSPASM PREVENTION 24 Each 0    cpap machine kit by Does Not Apply route. Change to Auto CPAP 5-20 and please provide new mask. Lots of leak on current Mask. (Patient taking differently: by Does Not Apply route. Change to Auto CPAP 5-20 and please provide new mask. Lots of leak on current Mask. First Choice O2) 1 Kit 0    acetaminophen (TYLENOL) 500 mg tablet Take 500 mg by mouth every six (6) hours as needed for Pain.  aspirin delayed-release 81 mg tablet Take 81 mg by mouth daily.  pantoprazole (PROTONIX) 40 mg tablet Take 40 mg by mouth daily. Indications: GASTROESOPHAGEAL REFLUX      ipratropium (ATROVENT) 0.02 % nebulizer solution 0.5 mg by Nebulization route as needed for Wheezing.  albuterol (PROVENTIL HFA, VENTOLIN HFA, PROAIR HFA) 90 mcg/actuation inhaler Take  by inhalation every six (6) hours as needed for Wheezing.  Calcium Carbonate-Vit D3-Min 600 mg calcium- 400 unit tab Take 1 Tab by mouth two (2) times a day.  gabapentin (NEURONTIN) 300 mg capsule Take 300 mg by mouth three (3) times daily.  NIFEdipine ER (ADALAT CC) 30 mg ER tablet Take  by mouth daily.  cloNIDine (CATAPRES) 0.1 mg tablet Take 0.1 mg by mouth two (2) times a day.  POTASSIUM CHLORIDE SR 10 MEQ TAB        furosemide (LASIX) 20 mg tablet Take  by mouth daily.  simvastatin (ZOCOR) 40 mg tablet Take 20 mg by mouth nightly.  metFORMIN (GLUCOPHAGE) 500 mg tablet Take 1,000 mg by mouth two (2) times daily (with meals).  fluticasone-salmeterol (ADVAIR DISKUS) 250-50 mcg/dose diskus inhaler Take 1 Puff by inhalation every twelve (12) hours.  traMADol (ULTRAM) 50 mg tablet Take 1 Tab by mouth every six (6) hours as needed for Pain. Max Daily Amount: 200 mg. Indications: Pain 42 Tab 0    nystatin (MYCOSTATIN) topical cream Apply 1 g to affected area three (3) times daily.  Apply under her breasts 30 g 0    ferrous sulfate 325 mg (65 mg iron) tablet Take 1 Tab by mouth two (2) times daily (with meals). Indications: IRON DEFICIENCY ANEMIA 60 Tab 0    polyethylene glycol (MIRALAX) 17 gram packet Take 1 Packet by mouth daily. Indications: constipation 30 Packet 0    cyanocobalamin (VITAMIN B-12) 1,000 mcg/mL injection 1 mL by IntraMUSCular route every seven (7) days. Indications: VITAMIN B12 DEFICIENCY 1 Vial 0    ergocalciferol (ERGOCALCIFEROL) 50,000 unit capsule Take 50,000 Units by mouth every seven (7) days. Indications: VITAMIN D DEFICIENCY (HIGH DOSE THERAPY)         Allergies   Allergen Reactions    Lisinopril Angioedema     Document from PCP record       History reviewed. No pertinent surgical history.     Social History     Socioeconomic History    Marital status:      Spouse name: Not on file    Number of children: Not on file    Years of education: Not on file    Highest education level: Not on file   Social Needs    Financial resource strain: Not on file    Food insecurity - worry: Not on file    Food insecurity - inability: Not on file    Transportation needs - medical: Not on file   [employer] needs - non-medical: Not on file   Occupational History    Not on file   Tobacco Use    Smoking status: Former Smoker     Packs/day: 1.50     Years: 60.00     Pack years: 90.00     Types: Cigarettes     Start date: 1952     Last attempt to quit: 2016     Years since quittin.5    Smokeless tobacco: Never Used   Substance and Sexual Activity    Alcohol use: No    Drug use: No    Sexual activity: Not on file   Other Topics Concern    Not on file   Social History Narrative    Not on file       REVIEW OF SYSTEMS:      CON: negative for recent weight loss/gain, fever, or chills  EYE: negative for double or blurry vision  ENT: negative for hoarseness  RS:   negative for cough, URI, SOB  CV:  negative for chest pain, palpitations  GI:    negative for blood in stool, nausea/vomiting  :  negative for blood in urine  MS: As per HPI  Other systems reviewed and noted below.    PHYSICAL EXAMINATION:  Visit Vitals  /70 (BP 1 Location: Left arm, BP Patient Position: Sitting)   Pulse 60   Temp 95.9 °F (35.5 °C) (Oral)   Resp 16   Ht 4' 7\" (1.397 m)   Wt 200 lb (90.7 kg)   SpO2 93%   BMI 46.48 kg/m²     Body mass index is 46.48 kg/m². GENERAL: Alert and oriented x3, in no acute distress, well-developed, well-nourished. HEENT: Normocephalic, atraumatic. RESP: Non labored breathing with equal chest rise on inspiration. CV: Well perfused extremities. No cyanosis or clubbing noted. ABDOMEN: Soft, non-tender, non-distended. PHYSICAL EXAMINATION:   Physical exam of the left shoulder with no obvious deformity. She has crepitus with shoulder range of motion. She has forward flexion of 90 degrees active and 120 degrees passive with crepitus and pain. Pain with supraspinatus, as well as infraspinatus and belly press testing. She is neurovascularly intact distally, although she does have some numbness and tingling in all of her fingertips, as well as some wasting of the intrinsic muscles of the hand. IMAGING:   X-rays of the left shoulder were taken in the office today. These show advanced left shoulder rotator cuff arthropathy. She also has significant cervical spine arthritis that is viewed on her x-rays of her shoulder. ASSESSMENT/PLAN:   Axel Moreno is an [de-identified]year-old female with left shoulder rotator cuff arthropathy. This is significant and severe. She is not interested in surgery. I offered her an injection today, which she is agreeable to. I will see her back as needed.          [facility] - [facility]  OFFICE PROCEDURE PROGRESS NOTE        Chart reviewed for the following:   Glenna Ivory MD, have reviewed the History, Physical and updated the Allergic reactions for [address] performed immediately prior to start of procedure:   I, Ashley Pedersen MD, have performed the following reviews on Douglas Vazquez prior to the start of the procedure:            * Patient was identified by name and date of birth   * Agreement on procedure being performed was verified  * Risks and Benefits explained to the patient  * Procedure site verified and marked as necessary  * Patient was positioned for comfort  * Consent was signed and verified    Time: 1130      Date of procedure: 1/15/2019    Procedure performed by:  Rigoberto Way MD    Provider assisted by: Adan Mccracken LPN    Patient assisted by: self    How tolerated by patient: tolerated the procedure well with no complications    Post Procedural Pain Scale: 0 - No Hurt    Comments: none                  Electronically signed by: Rigoberto Way MD

## 2019-02-27 ENCOUNTER — HOSPITAL ENCOUNTER (OUTPATIENT)
Dept: CT IMAGING | Age: 79
Discharge: HOME | End: 2019-02-27
Attending: FAMILY MEDICINE
Payer: MEDICARE

## 2019-02-27 DIAGNOSIS — R10.9 ABDOMINAL PAIN: ICD-10-CM

## 2019-02-27 PROCEDURE — 74176 CT ABD & PELVIS W/O CONTRAST: CPT

## 2019-02-27 PROCEDURE — 74011636320 HC RX REV CODE- 636/320

## 2019-02-27 RX ADMIN — DIATRIZOATE MEGLUMINE AND DIATRIZOATE SODIUM 30 ML: 660; 100 LIQUID ORAL; RECTAL at 15:09

## 2021-08-03 PROBLEM — R55 SYNCOPE: Status: RESOLVED | Noted: 2017-08-05 | Resolved: 2021-08-03

## 2022-06-22 PROBLEM — B34.9 VIRAL SYNDROME: Status: ACTIVE | Noted: 2022-06-22

## 2022-06-22 PROBLEM — J96.22 ACUTE ON CHRONIC RESPIRATORY FAILURE WITH HYPOXIA AND HYPERCAPNIA (HCC): Status: ACTIVE | Noted: 2022-06-22

## 2022-06-22 PROBLEM — J96.21 ACUTE ON CHRONIC RESPIRATORY FAILURE WITH HYPOXIA AND HYPERCAPNIA (HCC): Status: ACTIVE | Noted: 2022-06-22

## 2022-06-22 PROBLEM — R06.02 SHORTNESS OF BREATH: Status: ACTIVE | Noted: 2022-06-22

## 2022-06-22 PROBLEM — G93.41 ACUTE METABOLIC ENCEPHALOPATHY: Status: ACTIVE | Noted: 2022-06-22

## 2022-06-22 PROBLEM — J44.1 COPD EXACERBATION (HCC): Status: ACTIVE | Noted: 2022-06-22

## 2022-08-24 PROBLEM — R29.6 FREQUENT FALLS: Status: ACTIVE | Noted: 2022-08-24

## 2022-08-24 PROBLEM — R53.1 GENERALIZED WEAKNESS: Status: ACTIVE | Noted: 2022-08-24

## 2022-08-24 PROBLEM — J18.9 PNEUMONIA: Status: ACTIVE | Noted: 2022-08-24

## 2022-08-24 PROBLEM — J96.20 ACUTE ON CHRONIC RESPIRATORY FAILURE (HCC): Status: ACTIVE | Noted: 2022-08-24
